# Patient Record
Sex: MALE | Race: WHITE | Employment: FULL TIME | URBAN - NONMETROPOLITAN AREA
[De-identification: names, ages, dates, MRNs, and addresses within clinical notes are randomized per-mention and may not be internally consistent; named-entity substitution may affect disease eponyms.]

---

## 2018-09-20 ENCOUNTER — APPOINTMENT (OUTPATIENT)
Dept: GENERAL RADIOLOGY | Age: 58
End: 2018-09-20
Attending: INTERNAL MEDICINE
Payer: COMMERCIAL

## 2018-09-20 ENCOUNTER — HOSPITAL ENCOUNTER (OUTPATIENT)
Dept: CARDIAC CATH/INVASIVE PROCEDURES | Age: 58
Discharge: HOME | End: 2018-09-20
Attending: INTERNAL MEDICINE | Admitting: INTERNAL MEDICINE
Payer: COMMERCIAL

## 2018-09-20 VITALS
WEIGHT: 315 LBS | HEIGHT: 69 IN | DIASTOLIC BLOOD PRESSURE: 76 MMHG | HEART RATE: 67 BPM | TEMPERATURE: 99.3 F | BODY MASS INDEX: 46.65 KG/M2 | RESPIRATION RATE: 18 BRPM | SYSTOLIC BLOOD PRESSURE: 132 MMHG | OXYGEN SATURATION: 97 %

## 2018-09-20 DIAGNOSIS — R06.02 SHORTNESS OF BREATH: ICD-10-CM

## 2018-09-20 PROBLEM — R06.00 DYSPNEA: Status: ACTIVE | Noted: 2018-09-20

## 2018-09-20 LAB
ANION GAP SERPL CALCULATED.3IONS-SCNC: 13 MMOL/L (ref 7–19)
BUN BLDV-MCNC: 20 MG/DL (ref 6–20)
CALCIUM SERPL-MCNC: 9.4 MG/DL (ref 8.6–10)
CHLORIDE BLD-SCNC: 103 MMOL/L (ref 98–111)
CO2: 24 MMOL/L (ref 22–29)
CREAT SERPL-MCNC: 0.9 MG/DL (ref 0.5–1.2)
GFR NON-AFRICAN AMERICAN: >60
GLUCOSE BLD-MCNC: 96 MG/DL (ref 74–109)
HCT VFR BLD CALC: 42.3 % (ref 42–52)
HEMOGLOBIN: 14.3 G/DL (ref 14–18)
MCH RBC QN AUTO: 29.7 PG (ref 27–31)
MCHC RBC AUTO-ENTMCNC: 33.8 G/DL (ref 33–37)
MCV RBC AUTO: 87.8 FL (ref 80–94)
PDW BLD-RTO: 14.7 % (ref 11.5–14.5)
PLATELET # BLD: 228 K/UL (ref 130–400)
PMV BLD AUTO: 9.9 FL (ref 9.4–12.4)
POTASSIUM SERPL-SCNC: 4 MMOL/L (ref 3.5–5)
RBC # BLD: 4.82 M/UL (ref 4.7–6.1)
SODIUM BLD-SCNC: 140 MMOL/L (ref 136–145)
WBC # BLD: 6.1 K/UL (ref 4.8–10.8)

## 2018-09-20 PROCEDURE — 99220 PR INITIAL OBSERVATION CARE/DAY 70 MINUTES: CPT | Performed by: INTERNAL MEDICINE

## 2018-09-20 PROCEDURE — 85027 COMPLETE CBC AUTOMATED: CPT

## 2018-09-20 PROCEDURE — 2580000003 HC RX 258: Performed by: INTERNAL MEDICINE

## 2018-09-20 PROCEDURE — C1760 CLOSURE DEV, VASC: HCPCS

## 2018-09-20 PROCEDURE — 2500000003 HC RX 250 WO HCPCS

## 2018-09-20 PROCEDURE — 2709999900 HC NON-CHARGEABLE SUPPLY

## 2018-09-20 PROCEDURE — 93005 ELECTROCARDIOGRAM TRACING: CPT

## 2018-09-20 PROCEDURE — 93458 L HRT ARTERY/VENTRICLE ANGIO: CPT | Performed by: INTERNAL MEDICINE

## 2018-09-20 PROCEDURE — 36415 COLL VENOUS BLD VENIPUNCTURE: CPT

## 2018-09-20 PROCEDURE — 71046 X-RAY EXAM CHEST 2 VIEWS: CPT

## 2018-09-20 PROCEDURE — 99024 POSTOP FOLLOW-UP VISIT: CPT | Performed by: INTERNAL MEDICINE

## 2018-09-20 PROCEDURE — C1894 INTRO/SHEATH, NON-LASER: HCPCS

## 2018-09-20 PROCEDURE — 6360000002 HC RX W HCPCS

## 2018-09-20 PROCEDURE — 80048 BASIC METABOLIC PNL TOTAL CA: CPT

## 2018-09-20 RX ORDER — SODIUM CHLORIDE 9 MG/ML
INJECTION, SOLUTION INTRAVENOUS CONTINUOUS
Status: DISCONTINUED | OUTPATIENT
Start: 2018-09-20 | End: 2018-09-20 | Stop reason: HOSPADM

## 2018-09-20 RX ORDER — FLUTICASONE PROPIONATE 50 MCG
1 SPRAY, SUSPENSION (ML) NASAL PRN
COMMUNITY
End: 2019-11-25

## 2018-09-20 RX ORDER — SODIUM CHLORIDE 0.9 % (FLUSH) 0.9 %
10 SYRINGE (ML) INJECTION PRN
Status: DISCONTINUED | OUTPATIENT
Start: 2018-09-20 | End: 2018-09-20 | Stop reason: HOSPADM

## 2018-09-20 RX ORDER — OMEPRAZOLE 40 MG/1
40 CAPSULE, DELAYED RELEASE ORAL DAILY
COMMUNITY

## 2018-09-20 RX ORDER — HYDROCHLOROTHIAZIDE 25 MG/1
25 TABLET ORAL DAILY
COMMUNITY

## 2018-09-20 RX ORDER — FENOFIBRATE 200 MG/1
200 CAPSULE ORAL
COMMUNITY

## 2018-09-20 RX ORDER — ASPIRIN 81 MG/1
81 TABLET ORAL DAILY
COMMUNITY

## 2018-09-20 RX ORDER — MONTELUKAST SODIUM 10 MG/1
10 TABLET ORAL NIGHTLY
COMMUNITY

## 2018-09-20 RX ORDER — AZELASTINE 1 MG/ML
1 SPRAY, METERED NASAL 2 TIMES DAILY
COMMUNITY
End: 2019-11-25

## 2018-09-20 RX ORDER — SODIUM CHLORIDE 0.9 % (FLUSH) 0.9 %
10 SYRINGE (ML) INJECTION EVERY 12 HOURS SCHEDULED
Status: DISCONTINUED | OUTPATIENT
Start: 2018-09-20 | End: 2018-09-20 | Stop reason: HOSPADM

## 2018-09-20 RX ORDER — NIFEDIPINE 90 MG/1
90 TABLET, EXTENDED RELEASE ORAL DAILY
COMMUNITY

## 2018-09-20 RX ORDER — ALBUTEROL SULFATE 90 UG/1
2 AEROSOL, METERED RESPIRATORY (INHALATION) EVERY 6 HOURS PRN
COMMUNITY
End: 2020-07-06

## 2018-09-20 RX ORDER — CARVEDILOL 25 MG/1
25 TABLET ORAL 2 TIMES DAILY WITH MEALS
Status: ON HOLD | COMMUNITY
End: 2019-05-03 | Stop reason: HOSPADM

## 2018-09-20 RX ORDER — M-VIT,TX,IRON,MINS/CALC/FOLIC 27MG-0.4MG
1 TABLET ORAL DAILY
COMMUNITY

## 2018-09-20 RX ORDER — ALLOPURINOL 300 MG/1
300 TABLET ORAL DAILY
COMMUNITY

## 2018-09-20 RX ORDER — LOSARTAN POTASSIUM 50 MG/1
50 TABLET ORAL 2 TIMES DAILY
COMMUNITY

## 2018-09-20 RX ADMIN — SODIUM CHLORIDE: 9 INJECTION, SOLUTION INTRAVENOUS at 13:25

## 2018-09-21 LAB
EKG P AXIS: 45 DEGREES
EKG P-R INTERVAL: 168 MS
EKG Q-T INTERVAL: 412 MS
EKG QRS DURATION: 100 MS
EKG QTC CALCULATION (BAZETT): 412 MS
EKG T AXIS: 68 DEGREES

## 2018-09-21 NOTE — H&P
yes   Hypertension yes          Cardiac Specific Problems:    Specialty Problems     None            PRIOR CARDIAC PROBLEM LIST  (IF APPLICABLE):    8/47/5458  SE  negative for myocardial ischemia [facility])  8/18/2018  SE  negative for myocardial ischemia [facility])  9/18/2018  Office visit with Dr. Dariusz Lagos, persistent chest pain despite negative stress test, AUC indication 19, AUC score 7   9/20/18  Cath mild CAD, normal LVFX      Past Medical History:    Past Medical History:   Diagnosis Date    Arthritis     Diabetes mellitus ([address]     Hyperlipidemia     Hypertension     SOB (shortness of breath) 7/17/2013         Past Surgical History:    Past Surgical History:   Procedure Laterality Date    BACK SURGERY      CHOLECYSTECTOMY      JOINT REPLACEMENT Bilateral     knees 2016         Home Medications:   Prior to Admission medications    Medication Sig Start Date End Date Taking?  Authorizing Provider   allopurinol (ZYLOPRIM) 300 MG tablet Take 300 mg by mouth daily   Yes Historical Provider, MD   losartan (COZAAR) 50 MG tablet Take 50 mg by mouth 2 times daily   Yes Historical Provider, MD   carvedilol (COREG) 25 MG tablet Take 25 mg by mouth 2 times daily (with meals)   Yes Historical Provider, MD   montelukast (SINGULAIR) 10 MG tablet Take 10 mg by mouth nightly   Yes Historical Provider, MD   fenofibrate micronized (LOFIBRA) 200 MG capsule Take 200 mg by mouth every morning (before breakfast)   Yes Historical Provider, MD   NIFEdipine (PROCARDIA XL) 90 MG extended release tablet Take 90 mg by mouth daily   Yes Historical Provider, MD   hydrochlorothiazide (HYDRODIURIL) 25 MG tablet Take 25 mg by mouth daily   Yes Historical Provider, MD   metFORMIN (GLUCOPHAGE) 500 MG tablet Take 500 mg by mouth 2 times daily (with meals)   Yes Historical Provider, MD   azelastine (ASTELIN) 0.1 % nasal spray 1 spray by Nasal route nightly Use in each nostril as directed   Yes Historical Provider, MD   albuterol sulfate  (90 Base) MCG/ACT inhaler Inhale 2 puffs into the lungs every 6 hours as needed for Wheezing   Yes Historical Provider, MD   Misc Natural Products (OSTEO BI-FLEX ADV DOUBLE ST) TABS Take 1 tablet by mouth daily   Yes Historical Provider, MD   Multiple Vitamins-Minerals (THERAPEUTIC MULTIVITAMIN-MINERALS) tablet Take 1 tablet by mouth daily   Yes Historical Provider, MD   aspirin 81 MG EC tablet Take 81 mg by mouth daily   Yes Historical Provider, MD   fluticasone (FLONASE) 50 MCG/ACT nasal spray 1 spray by Each Nare route nightly   Yes Historical Provider, MD   omeprazole (PRILOSEC) 40 MG delayed release capsule Take 40 mg by mouth daily   Yes Historical Provider, MD        Facility Administered Medications:    sodium chloride flush  10 mL Intravenous 2 times per day       Allergies:  Levaquin [levofloxacin] and Penicillins     Social History:       Social History     Social History    Marital status:      Spouse name: N/A    Number of children: N/A    Years of education: N/A     Occupational History    Not on file. Social History Main Topics    Smoking status: Never Smoker    Smokeless tobacco: Never Used    Alcohol use No    Drug use: Unknown    Sexual activity: Not on file     Other Topics Concern    Not on file     Social History Narrative    No narrative on file       Family History:   History reviewed. No pertinent family history.       REVIEW OF SYSTEMS:     Except as noted in the HPI, all other systems are negative        PHYSICAL EXAMINATION:     BP (!) 143/84   Pulse 66   Temp 99.3 °F (37.4 °C) (Temporal)   Resp 20   Ht 5' 9\" (1.753 m)   Wt (!) 330 lb (149.7 kg)   SpO2 97%   BMI 48.73 kg/m²     GENERAL - well developed and well nourished, in no amount of generalized distress; is an active participant in this examination  HEENT -  PERRLA, Hearing appears normal, conjunctiva and lids are normal, ears and nose appear normal  NECK - no thyromegaly, no JVD, trachea is in the TODAY:                                                                                                                                                                                                                                            MEDICAL DECISION MAKING             Cardiac Specific Problem / Diagnosis  Discussion and Data Reviewed Diagnostic Procedures Ordered Management Options Selected           1. Presenting problem / symptom    Persistent dyspnea / chest discomfort despite a negative stress test  are worsening   9/18/2018  Office visit with Dr. Cristino London, persistent chest pain despite negative stress test, AUC indication 19, AUC score 7 Yes: cardiac catheterization Continue current medications:     Yes:            2. Diabetes mellitus Initial presentation during this evaluation   While the primary care provider manages this disease state, I have personally reviewed these laboratory values today as they influence the cardiovascular condition that I currently manage    No Continue current medications:    Yes:            3. Systemic arterial hypertension Initial presentation during this evaluation Systolic (34RZP), GTB:762 , Min:143 , YLU:407    Diastolic (39KWX), IWX:05, Min:77, Max:84   No Continue current medications:       yes         PLAN:    1. Continue present medications except for changes as noted above  2. Continue to monitor rhythm  3. Further orders per clinical course. 4. Cardiac catheterization  5. I have discussed the risks, benefits and options with the patient and his family. They appear to understand, have no questions, and wish to proceed. This procedure is scheduled for today. Discussed with patient and family and nursing.     I greatly appreciate the opportunity and your confidence in allowing me to participate in the care of Dalton Soriano    Electronically signed by Chelly Carreon MD on 9/20/18     [facility] [place]

## 2018-09-21 NOTE — PROCEDURES
none      Results:    Hemodynamics:      Site Pressure mmHg        Left ventricular pressure 135/15 mmHg   Left ventricular end-diastolic pressure (LVEDP) 29 mmHg   Aortic pressure 138/82 mmHg   Mean aortic pressure 107 mmHg       Angiography:    Coronary Arteries:    Left Main Coronary Artery:  A large vessel which arises from the left sinus of Valsalva. It divides into the left anterior descending coronary artery and the left circumflex. There is mild diffuse disease throughout the entire length of the vessel. Left Anterior Coronary Artery:  The LAD is a moderate sized vessel with several diagonal branches. There is mild diffuse disease throughout the entire length of the vessel. Left Circumflex Coronary Artery:  The LCx is a moderate sized vessel with several marginal branches. There is mild diffuse disease throughout the entire length of the vessel. Right Coronary Artery:  The RCA is a moderate sized dominant vessel which arises from the right sinus of Valsalva. There is mild diffuse disease throughout the entire length of the vessel. Left Ventriculogram:  The left ventriculogram is obtained in the right oblique projection. All regional wall segments move appropriately. The estimated visual ejection fraction is > 60%. There is no mitral regurgitation nor is there a pull back gradient seen across the aortic valve. Summary:      1. Successful femoral artery ultrasound  2. Successful femoral artery arteriogram  3. Mild coronary artery disease  4. Left ventricular function is normal      RECOMMENDATIONS:    1.  Reassurance  2. Risk factor modification  3. Evaluation of etiologies of non cardiac chest discomfort should symptoms persist or progress  4. Follow  Up with Primary care provider as arranged  5.   Follow up with Cardiology \"prn\"       Electronically signed by Duncan Garcia MD on 9/20/18

## 2018-09-21 NOTE — DISCHARGE SUMMARY
[facility]    Discharge Summary          I personally saw the patient and rounded with:  cath lab nurses RN, on  9/20/18      The observations documented in this note, including the assessment and plan are mine              Patient ID: Fani Galan      Patient's PCP: Keon StacyKody Brooks Graves DO    Admit Date: 9/20/2018     Discharge Date:  09/20/18     Admitting Physician:  Татьяна Dodge MD       Discharge Physician: Татьяна Dodge MD     Discharge Diagnoses:    1. Dyspnea and chest discomfort of ? Etiology, doubt myocardial ischemia, cardiac catheterization, 09/20/18 mild CAD, normal left ventricular function    7/17/2013  SE  negative for myocardial ischemia [facility])  8/18/2018  SE  negative for myocardial ischemia [facility])  9/18/2018  Office visit with Dr. Nikia Acevedo, persistent chest pain despite negative stress test, AUC indication 19, AUC score 7   9/20/18  Cath mild CAD, normal LVFX    2. Diabetes mellitus  3. Hypertension  4. Hypercholestreremia        Cardiac Specific Diagnoses:    Specialty Problems     None            The patient was seen and examined on day of discharge and this discharge summary is in conjunction with any daily progress note from day of discharge. History of Present Illness:     Fani Galan is a 62 y.o. male who presents to [place] [address] with symptoms / signs / problem or diagnosis of need for cardiac catheterization. He reports to Dr. Nikia Acevedo the following:  \". .. In addition, he presented with shortness of breath. With a \"heavy sensation\" of his chest.  He denies wheezing. He says that albuterol maker the symptoms slightly better. In the past few weeks, he has also had shortness of breath that has caused him to stop unloading his truck. Symptoms have worsened in the increasing heat and humidity. ... The symptom is ongoing. The complaint moderately limits activities. ... Important triggers include activity.   \"  With persistent signs of Medication List           Details   metFORMIN (GLUCOPHAGE) 500 MG tablet HOLD METFORMIN FOR 48 HOURS AFTER THE CARDIAC CATHETERIZATION  Qty: 60 tablet, Refills: 3              Details   allopurinol (ZYLOPRIM) 300 MG tablet Take 300 mg by mouth daily      losartan (COZAAR) 50 MG tablet Take 50 mg by mouth 2 times daily      carvedilol (COREG) 25 MG tablet Take 25 mg by mouth 2 times daily (with meals)      montelukast (SINGULAIR) 10 MG tablet Take 10 mg by mouth nightly      fenofibrate micronized (LOFIBRA) 200 MG capsule Take 200 mg by mouth every morning (before breakfast)      NIFEdipine (PROCARDIA XL) 90 MG extended release tablet Take 90 mg by mouth daily      hydrochlorothiazide (HYDRODIURIL) 25 MG tablet Take 25 mg by mouth daily      azelastine (ASTELIN) 0.1 % nasal spray 1 spray by Nasal route nightly Use in each nostril as directed      albuterol sulfate  (90 Base) MCG/ACT inhaler Inhale 2 puffs into the lungs every 6 hours as needed for Wheezing      Misc Natural Products (OSTEO BI-FLEX ADV DOUBLE ST) TABS Take 1 tablet by mouth daily      Multiple Vitamins-Minerals (THERAPEUTIC MULTIVITAMIN-MINERALS) tablet Take 1 tablet by mouth daily      aspirin 81 MG EC tablet Take 81 mg by mouth daily      fluticasone (FLONASE) 50 MCG/ACT nasal spray 1 spray by Each Nare route nightly      omeprazole (PRILOSEC) 40 MG delayed release capsule Take 40 mg by mouth daily                 Disposition:      1. Reassurance  2. Risk factor modification  3. Evaluation of etiologies of non cardiac chest discomfort should symptoms persist or progress  4. Follow  Up with Primary care provider as arranged  5.   Follow up with Cardiology \"prn\"       Electronically signed by Michael Vance MD on 9/20/18

## 2019-04-30 ENCOUNTER — HOSPITAL ENCOUNTER (INPATIENT)
Age: 59
LOS: 3 days | Discharge: HOME | DRG: 287 | End: 2019-05-03
Attending: INTERNAL MEDICINE | Admitting: INTERNAL MEDICINE
Payer: COMMERCIAL

## 2019-04-30 ENCOUNTER — APPOINTMENT (OUTPATIENT)
Dept: GENERAL RADIOLOGY | Age: 59
DRG: 287 | End: 2019-04-30
Attending: INTERNAL MEDICINE
Payer: COMMERCIAL

## 2019-04-30 PROBLEM — I48.91 A-FIB (HCC): Status: ACTIVE | Noted: 2019-04-30

## 2019-04-30 LAB
ALBUMIN SERPL-MCNC: 4.3 G/DL (ref 3.5–5.2)
ALP BLD-CCNC: 69 U/L (ref 40–130)
ALT SERPL-CCNC: 40 U/L (ref 5–41)
ANION GAP SERPL CALCULATED.3IONS-SCNC: 17 MMOL/L (ref 7–19)
AST SERPL-CCNC: 27 U/L (ref 5–40)
BASOPHILS ABSOLUTE: 0.1 K/UL (ref 0–0.2)
BASOPHILS RELATIVE PERCENT: 1.1 % (ref 0–1)
BILIRUB SERPL-MCNC: 0.7 MG/DL (ref 0.2–1.2)
BILIRUBIN URINE: NEGATIVE
BLOOD, URINE: NEGATIVE
BUN BLDV-MCNC: 14 MG/DL (ref 6–20)
CALCIUM SERPL-MCNC: 9.6 MG/DL (ref 8.6–10)
CHLORIDE BLD-SCNC: 105 MMOL/L (ref 98–111)
CLARITY: CLEAR
CO2: 21 MMOL/L (ref 22–29)
COLOR: YELLOW
CREAT SERPL-MCNC: 0.8 MG/DL (ref 0.5–1.2)
EOSINOPHILS ABSOLUTE: 0.3 K/UL (ref 0–0.6)
EOSINOPHILS RELATIVE PERCENT: 3.6 % (ref 0–5)
GFR NON-AFRICAN AMERICAN: >60
GLUCOSE BLD-MCNC: 101 MG/DL (ref 70–99)
GLUCOSE BLD-MCNC: 102 MG/DL (ref 74–109)
GLUCOSE URINE: NEGATIVE MG/DL
HCT VFR BLD CALC: 46.6 % (ref 42–52)
HEMOGLOBIN: 16 G/DL (ref 14–18)
KETONES, URINE: NEGATIVE MG/DL
LEUKOCYTE ESTERASE, URINE: NEGATIVE
LYMPHOCYTES ABSOLUTE: 2.1 K/UL (ref 1.1–4.5)
LYMPHOCYTES RELATIVE PERCENT: 27.3 % (ref 20–40)
MCH RBC QN AUTO: 29.8 PG (ref 27–31)
MCHC RBC AUTO-ENTMCNC: 34.3 G/DL (ref 33–37)
MCV RBC AUTO: 86.8 FL (ref 80–94)
MONOCYTES ABSOLUTE: 0.9 K/UL (ref 0–0.9)
MONOCYTES RELATIVE PERCENT: 11.3 % (ref 0–10)
NEUTROPHILS ABSOLUTE: 4.3 K/UL (ref 1.5–7.5)
NEUTROPHILS RELATIVE PERCENT: 56.3 % (ref 50–65)
NITRITE, URINE: NEGATIVE
PDW BLD-RTO: 14.8 % (ref 11.5–14.5)
PERFORMED ON: ABNORMAL
PH UA: 7 (ref 5–8)
PLATELET # BLD: 303 K/UL (ref 130–400)
PMV BLD AUTO: 10.6 FL (ref 9.4–12.4)
POTASSIUM REFLEX MAGNESIUM: 3.9 MMOL/L (ref 3.5–5)
PROTEIN UA: NEGATIVE MG/DL
RBC # BLD: 5.37 M/UL (ref 4.7–6.1)
SODIUM BLD-SCNC: 143 MMOL/L (ref 136–145)
SPECIFIC GRAVITY UA: 1.01 (ref 1–1.03)
TOTAL PROTEIN: 6.7 G/DL (ref 6.6–8.7)
TROPONIN: <0.01 NG/ML (ref 0–0.03)
UROBILINOGEN, URINE: 1 E.U./DL
WBC # BLD: 7.6 K/UL (ref 4.8–10.8)

## 2019-04-30 PROCEDURE — 71045 X-RAY EXAM CHEST 1 VIEW: CPT

## 2019-04-30 PROCEDURE — 82948 REAGENT STRIP/BLOOD GLUCOSE: CPT

## 2019-04-30 PROCEDURE — 2500000003 HC RX 250 WO HCPCS: Performed by: INTERNAL MEDICINE

## 2019-04-30 PROCEDURE — 99223 1ST HOSP IP/OBS HIGH 75: CPT | Performed by: INTERNAL MEDICINE

## 2019-04-30 PROCEDURE — 6360000002 HC RX W HCPCS: Performed by: INTERNAL MEDICINE

## 2019-04-30 PROCEDURE — 6370000000 HC RX 637 (ALT 250 FOR IP): Performed by: INTERNAL MEDICINE

## 2019-04-30 PROCEDURE — 85025 COMPLETE CBC W/AUTO DIFF WBC: CPT

## 2019-04-30 PROCEDURE — 93005 ELECTROCARDIOGRAM TRACING: CPT

## 2019-04-30 PROCEDURE — 81003 URINALYSIS AUTO W/O SCOPE: CPT

## 2019-04-30 PROCEDURE — 2580000003 HC RX 258: Performed by: INTERNAL MEDICINE

## 2019-04-30 PROCEDURE — 87081 CULTURE SCREEN ONLY: CPT

## 2019-04-30 PROCEDURE — 84484 ASSAY OF TROPONIN QUANT: CPT

## 2019-04-30 PROCEDURE — 2100000000 HC CCU R&B

## 2019-04-30 PROCEDURE — 36415 COLL VENOUS BLD VENIPUNCTURE: CPT

## 2019-04-30 PROCEDURE — 80053 COMPREHEN METABOLIC PANEL: CPT

## 2019-04-30 RX ORDER — ASPIRIN 81 MG/1
81 TABLET ORAL DAILY
Status: DISCONTINUED | OUTPATIENT
Start: 2019-04-30 | End: 2019-05-03 | Stop reason: HOSPADM

## 2019-04-30 RX ORDER — NIFEDIPINE 90 MG/1
90 TABLET, EXTENDED RELEASE ORAL DAILY
Status: DISCONTINUED | OUTPATIENT
Start: 2019-05-01 | End: 2019-05-03 | Stop reason: HOSPADM

## 2019-04-30 RX ORDER — HYDROCHLOROTHIAZIDE 25 MG/1
25 TABLET ORAL DAILY
Status: DISCONTINUED | OUTPATIENT
Start: 2019-05-01 | End: 2019-05-03 | Stop reason: HOSPADM

## 2019-04-30 RX ORDER — FLUTICASONE PROPIONATE 50 MCG
1 SPRAY, SUSPENSION (ML) NASAL PRN
Status: DISCONTINUED | OUTPATIENT
Start: 2019-04-30 | End: 2019-05-03 | Stop reason: HOSPADM

## 2019-04-30 RX ORDER — PANTOPRAZOLE SODIUM 40 MG/1
40 TABLET, DELAYED RELEASE ORAL
Status: DISCONTINUED | OUTPATIENT
Start: 2019-04-30 | End: 2019-05-03 | Stop reason: HOSPADM

## 2019-04-30 RX ORDER — ALBUTEROL SULFATE 90 UG/1
2 AEROSOL, METERED RESPIRATORY (INHALATION) EVERY 6 HOURS PRN
Status: DISCONTINUED | OUTPATIENT
Start: 2019-04-30 | End: 2019-05-03 | Stop reason: HOSPADM

## 2019-04-30 RX ORDER — FENOFIBRATE 160 MG/1
160 TABLET ORAL
Status: DISCONTINUED | OUTPATIENT
Start: 2019-05-01 | End: 2019-05-03 | Stop reason: HOSPADM

## 2019-04-30 RX ORDER — DILTIAZEM HYDROCHLORIDE 5 MG/ML
10 INJECTION INTRAVENOUS ONCE
Status: COMPLETED | OUTPATIENT
Start: 2019-04-30 | End: 2019-04-30

## 2019-04-30 RX ORDER — DOCUSATE SODIUM 100 MG/1
100 CAPSULE, LIQUID FILLED ORAL DAILY PRN
Status: DISCONTINUED | OUTPATIENT
Start: 2019-04-30 | End: 2019-05-03 | Stop reason: HOSPADM

## 2019-04-30 RX ORDER — AMIODARONE HYDROCHLORIDE 200 MG/1
300 TABLET ORAL DAILY
Status: DISCONTINUED | OUTPATIENT
Start: 2019-04-30 | End: 2019-05-01

## 2019-04-30 RX ORDER — AMIODARONE HYDROCHLORIDE 50 MG/ML
150 INJECTION, SOLUTION INTRAVENOUS ONCE
Status: DISCONTINUED | OUTPATIENT
Start: 2019-04-30 | End: 2019-04-30 | Stop reason: SDUPTHER

## 2019-04-30 RX ORDER — ACETAMINOPHEN 325 MG/1
650 TABLET ORAL EVERY 4 HOURS PRN
Status: DISCONTINUED | OUTPATIENT
Start: 2019-04-30 | End: 2019-05-03 | Stop reason: HOSPADM

## 2019-04-30 RX ORDER — MONTELUKAST SODIUM 10 MG/1
10 TABLET ORAL NIGHTLY
Status: DISCONTINUED | OUTPATIENT
Start: 2019-04-30 | End: 2019-05-03 | Stop reason: HOSPADM

## 2019-04-30 RX ORDER — M-VIT,TX,IRON,MINS/CALC/FOLIC 27MG-0.4MG
1 TABLET ORAL DAILY
Status: DISCONTINUED | OUTPATIENT
Start: 2019-04-30 | End: 2019-05-03 | Stop reason: HOSPADM

## 2019-04-30 RX ORDER — CARVEDILOL 25 MG/1
25 TABLET ORAL 2 TIMES DAILY WITH MEALS
Status: DISCONTINUED | OUTPATIENT
Start: 2019-04-30 | End: 2019-05-03

## 2019-04-30 RX ORDER — LOSARTAN POTASSIUM 50 MG/1
50 TABLET ORAL 2 TIMES DAILY
Status: DISCONTINUED | OUTPATIENT
Start: 2019-04-30 | End: 2019-05-03 | Stop reason: HOSPADM

## 2019-04-30 RX ORDER — ONDANSETRON 2 MG/ML
4 INJECTION INTRAMUSCULAR; INTRAVENOUS EVERY 6 HOURS PRN
Status: DISCONTINUED | OUTPATIENT
Start: 2019-04-30 | End: 2019-05-03 | Stop reason: HOSPADM

## 2019-04-30 RX ORDER — ALLOPURINOL 300 MG/1
300 TABLET ORAL DAILY
Status: DISCONTINUED | OUTPATIENT
Start: 2019-05-01 | End: 2019-05-03 | Stop reason: HOSPADM

## 2019-04-30 RX ORDER — DOCUSATE SODIUM 100 MG/1
100 CAPSULE, LIQUID FILLED ORAL DAILY
Status: DISCONTINUED | OUTPATIENT
Start: 2019-04-30 | End: 2019-04-30

## 2019-04-30 RX ADMIN — LOSARTAN POTASSIUM 50 MG: 50 TABLET ORAL at 20:35

## 2019-04-30 RX ADMIN — DEXTROSE MONOHYDRATE 150 MG: 50 INJECTION, SOLUTION INTRAVENOUS at 21:54

## 2019-04-30 RX ADMIN — DEXTROSE MONOHYDRATE 150 MG: 50 INJECTION, SOLUTION INTRAVENOUS at 16:57

## 2019-04-30 RX ADMIN — CARVEDILOL 25 MG: 25 TABLET, FILM COATED ORAL at 16:50

## 2019-04-30 RX ADMIN — DILTIAZEM HYDROCHLORIDE 10 MG: 5 INJECTION INTRAVENOUS at 21:41

## 2019-04-30 RX ADMIN — MONTELUKAST SODIUM 10 MG: 10 TABLET, FILM COATED ORAL at 20:35

## 2019-04-30 RX ADMIN — APIXABAN 5 MG: 5 TABLET, FILM COATED ORAL at 20:35

## 2019-04-30 RX ADMIN — AMIODARONE HYDROCHLORIDE 300 MG: 200 TABLET ORAL at 16:50

## 2019-04-30 RX ADMIN — METFORMIN HYDROCHLORIDE 500 MG: 500 TABLET ORAL at 16:51

## 2019-04-30 RX ADMIN — AMIODARONE HYDROCHLORIDE 1 MG/MIN: 50 INJECTION, SOLUTION INTRAVENOUS at 17:12

## 2019-04-30 RX ADMIN — DILTIAZEM HYDROCHLORIDE 5 MG/HR: 5 INJECTION INTRAVENOUS at 22:08

## 2019-04-30 RX ADMIN — ASPIRIN 81 MG: 81 TABLET ORAL at 16:50

## 2019-04-30 ASSESSMENT — PAIN SCALES - GENERAL
PAINLEVEL_OUTOF10: 0

## 2019-04-30 NOTE — PROGRESS NOTES
Dr Elyse Rinne in unit rounding on pt.  Verbal face to face medication clarification made regarding pt home medications and amiodarone orders  Electronically signed by Maritza Santiago RN on 4/30/2019 at 5:39 PM

## 2019-05-01 ENCOUNTER — APPOINTMENT (OUTPATIENT)
Dept: NUCLEAR MEDICINE | Age: 59
DRG: 287 | End: 2019-05-01
Attending: INTERNAL MEDICINE
Payer: COMMERCIAL

## 2019-05-01 LAB
ANION GAP SERPL CALCULATED.3IONS-SCNC: 10 MMOL/L (ref 7–19)
BUN BLDV-MCNC: 18 MG/DL (ref 6–20)
CALCIUM SERPL-MCNC: 9.5 MG/DL (ref 8.6–10)
CHLORIDE BLD-SCNC: 104 MMOL/L (ref 98–111)
CHOLESTEROL, TOTAL: 183 MG/DL (ref 160–199)
CO2: 25 MMOL/L (ref 22–29)
CREAT SERPL-MCNC: 1.1 MG/DL (ref 0.5–1.2)
EKG P AXIS: 29 DEGREES
EKG P AXIS: NORMAL DEGREES
EKG P-R INTERVAL: 144 MS
EKG P-R INTERVAL: NORMAL MS
EKG Q-T INTERVAL: 306 MS
EKG Q-T INTERVAL: 338 MS
EKG QRS DURATION: 90 MS
EKG QRS DURATION: 92 MS
EKG QTC CALCULATION (BAZETT): 427 MS
EKG QTC CALCULATION (BAZETT): 444 MS
EKG T AXIS: 24 DEGREES
EKG T AXIS: 93 DEGREES
GFR NON-AFRICAN AMERICAN: >60
GLUCOSE BLD-MCNC: 117 MG/DL (ref 70–99)
GLUCOSE BLD-MCNC: 138 MG/DL (ref 74–109)
GLUCOSE BLD-MCNC: 95 MG/DL (ref 70–99)
HBA1C MFR BLD: 5.3 % (ref 4–6)
HCT VFR BLD CALC: 45.4 % (ref 42–52)
HDLC SERPL-MCNC: 25 MG/DL (ref 55–121)
HEMOGLOBIN: 15.7 G/DL (ref 14–18)
LDL CHOLESTEROL CALCULATED: 99 MG/DL
LV EF: 59 %
LVEF MODALITY: NORMAL
MCH RBC QN AUTO: 30.5 PG (ref 27–31)
MCHC RBC AUTO-ENTMCNC: 34.6 G/DL (ref 33–37)
MCV RBC AUTO: 88.2 FL (ref 80–94)
MRSA CULTURE ONLY: NORMAL
PDW BLD-RTO: 14.7 % (ref 11.5–14.5)
PERFORMED ON: ABNORMAL
PERFORMED ON: NORMAL
PLATELET # BLD: 307 K/UL (ref 130–400)
PMV BLD AUTO: 10.3 FL (ref 9.4–12.4)
POTASSIUM SERPL-SCNC: 4.2 MMOL/L (ref 3.5–5)
RBC # BLD: 5.15 M/UL (ref 4.7–6.1)
SODIUM BLD-SCNC: 139 MMOL/L (ref 136–145)
TRIGL SERPL-MCNC: 297 MG/DL (ref 0–149)
WBC # BLD: 9.2 K/UL (ref 4.8–10.8)

## 2019-05-01 PROCEDURE — 83036 HEMOGLOBIN GLYCOSYLATED A1C: CPT

## 2019-05-01 PROCEDURE — 2580000003 HC RX 258: Performed by: INTERNAL MEDICINE

## 2019-05-01 PROCEDURE — 36415 COLL VENOUS BLD VENIPUNCTURE: CPT

## 2019-05-01 PROCEDURE — 93005 ELECTROCARDIOGRAM TRACING: CPT

## 2019-05-01 PROCEDURE — 2100000000 HC CCU R&B

## 2019-05-01 PROCEDURE — 82948 REAGENT STRIP/BLOOD GLUCOSE: CPT

## 2019-05-01 PROCEDURE — 85027 COMPLETE CBC AUTOMATED: CPT

## 2019-05-01 PROCEDURE — 6370000000 HC RX 637 (ALT 250 FOR IP): Performed by: INTERNAL MEDICINE

## 2019-05-01 PROCEDURE — 6360000002 HC RX W HCPCS: Performed by: INTERNAL MEDICINE

## 2019-05-01 PROCEDURE — 99233 SBSQ HOSP IP/OBS HIGH 50: CPT | Performed by: INTERNAL MEDICINE

## 2019-05-01 PROCEDURE — 80048 BASIC METABOLIC PNL TOTAL CA: CPT

## 2019-05-01 PROCEDURE — 2500000003 HC RX 250 WO HCPCS: Performed by: INTERNAL MEDICINE

## 2019-05-01 PROCEDURE — 80061 LIPID PANEL: CPT

## 2019-05-01 PROCEDURE — A9500 TC99M SESTAMIBI: HCPCS | Performed by: INTERNAL MEDICINE

## 2019-05-01 PROCEDURE — 3430000000 HC RX DIAGNOSTIC RADIOPHARMACEUTICAL: Performed by: INTERNAL MEDICINE

## 2019-05-01 PROCEDURE — 93306 TTE W/DOPPLER COMPLETE: CPT

## 2019-05-01 PROCEDURE — 78452 HT MUSCLE IMAGE SPECT MULT: CPT

## 2019-05-01 RX ORDER — SODIUM CHLORIDE 0.9 % (FLUSH) 0.9 %
10 SYRINGE (ML) INJECTION PRN
Status: DISCONTINUED | OUTPATIENT
Start: 2019-05-01 | End: 2019-05-03

## 2019-05-01 RX ORDER — SODIUM CHLORIDE 9 MG/ML
INJECTION, SOLUTION INTRAVENOUS CONTINUOUS
Status: DISCONTINUED | OUTPATIENT
Start: 2019-05-01 | End: 2019-05-03 | Stop reason: HOSPADM

## 2019-05-01 RX ORDER — SOTALOL HYDROCHLORIDE 80 MG/1
80 TABLET ORAL 2 TIMES DAILY
Status: DISCONTINUED | OUTPATIENT
Start: 2019-05-01 | End: 2019-05-03 | Stop reason: HOSPADM

## 2019-05-01 RX ORDER — SODIUM CHLORIDE 0.9 % (FLUSH) 0.9 %
10 SYRINGE (ML) INJECTION EVERY 12 HOURS SCHEDULED
Status: DISCONTINUED | OUTPATIENT
Start: 2019-05-01 | End: 2019-05-03

## 2019-05-01 RX ADMIN — APIXABAN 5 MG: 5 TABLET, FILM COATED ORAL at 08:10

## 2019-05-01 RX ADMIN — MULTIPLE VITAMINS W/ MINERALS TAB 1 TABLET: TAB at 08:10

## 2019-05-01 RX ADMIN — AMIODARONE HYDROCHLORIDE 0.5 MG/MIN: 50 INJECTION, SOLUTION INTRAVENOUS at 13:36

## 2019-05-01 RX ADMIN — SODIUM CHLORIDE: 9 INJECTION, SOLUTION INTRAVENOUS at 06:39

## 2019-05-01 RX ADMIN — AMIODARONE HYDROCHLORIDE 0.5 MG/MIN: 50 INJECTION, SOLUTION INTRAVENOUS at 03:52

## 2019-05-01 RX ADMIN — REGADENOSON 0.4 MG: 0.08 INJECTION, SOLUTION INTRAVENOUS at 13:00

## 2019-05-01 RX ADMIN — AMIODARONE HYDROCHLORIDE 300 MG: 200 TABLET ORAL at 08:10

## 2019-05-01 RX ADMIN — SOTALOL HYDROCHLORIDE 80 MG: 80 TABLET ORAL at 20:37

## 2019-05-01 RX ADMIN — NIFEDIPINE 90 MG: 90 TABLET, FILM COATED, EXTENDED RELEASE ORAL at 08:10

## 2019-05-01 RX ADMIN — Medication 10 ML: at 20:37

## 2019-05-01 RX ADMIN — CARVEDILOL 25 MG: 25 TABLET, FILM COATED ORAL at 18:07

## 2019-05-01 RX ADMIN — CARVEDILOL 25 MG: 25 TABLET, FILM COATED ORAL at 08:10

## 2019-05-01 RX ADMIN — PANTOPRAZOLE SODIUM 40 MG: 40 TABLET, DELAYED RELEASE ORAL at 18:06

## 2019-05-01 RX ADMIN — TETRAKIS(2-METHOXYISOBUTYLISOCYANIDE)COPPER(I) TETRAFLUOROBORATE 30 MILLICURIE: 1 INJECTION, POWDER, LYOPHILIZED, FOR SOLUTION INTRAVENOUS at 13:29

## 2019-05-01 RX ADMIN — LOSARTAN POTASSIUM 50 MG: 50 TABLET ORAL at 20:37

## 2019-05-01 RX ADMIN — HYDROCHLOROTHIAZIDE 25 MG: 25 TABLET ORAL at 08:10

## 2019-05-01 RX ADMIN — APIXABAN 5 MG: 5 TABLET, FILM COATED ORAL at 20:37

## 2019-05-01 RX ADMIN — FENOFIBRATE 160 MG: 160 TABLET ORAL at 06:39

## 2019-05-01 RX ADMIN — ASPIRIN 81 MG: 81 TABLET ORAL at 08:10

## 2019-05-01 RX ADMIN — LOSARTAN POTASSIUM 50 MG: 50 TABLET ORAL at 08:10

## 2019-05-01 RX ADMIN — TETRAKIS(2-METHOXYISOBUTYLISOCYANIDE)COPPER(I) TETRAFLUOROBORATE 10 MILLICURIE: 1 INJECTION, POWDER, LYOPHILIZED, FOR SOLUTION INTRAVENOUS at 13:28

## 2019-05-01 RX ADMIN — MONTELUKAST SODIUM 10 MG: 10 TABLET, FILM COATED ORAL at 20:37

## 2019-05-01 RX ADMIN — PANTOPRAZOLE SODIUM 40 MG: 40 TABLET, DELAYED RELEASE ORAL at 06:39

## 2019-05-01 RX ADMIN — ALLOPURINOL 300 MG: 300 TABLET ORAL at 08:10

## 2019-05-01 RX ADMIN — DILTIAZEM HYDROCHLORIDE 2.5 MG/HR: 5 INJECTION INTRAVENOUS at 14:13

## 2019-05-01 RX ADMIN — Medication 10 ML: at 08:11

## 2019-05-01 ASSESSMENT — PAIN SCALES - GENERAL
PAINLEVEL_OUTOF10: 0

## 2019-05-01 NOTE — PROGRESS NOTES
Pt rate still holding 137-143. Pt states he no longer feels like his heart is beating as hard, stated earlier it was shaking his chest when it beat. Pt also brought CPAP from home to sleep with. Will continue to monitor.   Electronically signed by Adalberto Cueto RN on 4/30/2019 at 11:55 PM.

## 2019-05-01 NOTE — H&P
(FLONASE) 50 MCG/ACT nasal spray 1 spray by Each Nare route as needed    Yes Historical Provider, MD   omeprazole (PRILOSEC) 40 MG delayed release capsule Take 40 mg by mouth daily   Yes Historical Provider, MD   metFORMIN (GLUCOPHAGE) 500 MG tablet HOLD METFORMIN FOR 48 HOURS AFTER THE CARDIAC CATHETERIZATION 9/20/18  Yes Nahomy Ward MD   albuterol sulfate  (90 Base) MCG/ACT inhaler Inhale 2 puffs into the lungs every 6 hours as needed for Wheezing    Historical Provider, MD        Facility Administered Medications:    apixaban  5 mg Oral BID    amiodarone  300 mg Oral Daily    [START ON 5/1/2019] allopurinol  300 mg Oral Daily    aspirin  81 mg Oral Daily    carvedilol  25 mg Oral BID WC    [START ON 5/1/2019] fenofibrate  160 mg Oral QAM AC    [START ON 5/1/2019] hydrochlorothiazide  25 mg Oral Daily    losartan  50 mg Oral BID    metFORMIN  500 mg Oral BID WC    montelukast  10 mg Oral Nightly    therapeutic multivitamin-minerals  1 tablet Oral Daily    [START ON 5/1/2019] NIFEdipine  90 mg Oral Daily    pantoprazole  40 mg Oral BID AC       Allergies:  Levaquin [levofloxacin] and Penicillins     Social History:       Social History     Socioeconomic History    Marital status:      Spouse name: Kylie Agustin Number of children: 2    Years of education: Not on file    Highest education level: Not on file   Occupational History    Not on file   Social Needs    Financial resource strain: Not on file    Food insecurity:     Worry: Not on file     Inability: Not on file    Transportation needs:     Medical: Not on file     Non-medical: Not on file   Tobacco Use    Smoking status: Never Smoker    Smokeless tobacco: Never Used   Substance and Sexual Activity    Alcohol use: No    Drug use: Never    Sexual activity: Yes     Partners: Female   Lifestyle    Physical activity:     Days per week: Not on file     Minutes per session: Not on file    Stress: Not on file   Relationships  Social connections:     Talks on phone: Not on file     Gets together: Not on file     Attends [employer] service: Not on file     Active member of club or organization: Not on file     Attends meetings of clubs or organizations: Not on file     Relationship status: Not on file    Intimate partner violence:     Fear of current or ex partner: Not on file     Emotionally abused: Not on file     Physically abused: Not on file     Forced sexual activity: Not on file   Other Topics Concern    Not on file   Social History Narrative    Not on file       Family History:     Family History   Problem Relation Age of Onset    Cancer Mother     Coronary Art Dis Father     Heart Attack Father     Heart Disease Father     High Blood Pressure Father     High Cholesterol Father     No Known Problems Sister     No Known Problems Brother     No Known Problems Brother          REVIEW OF SYSTEMS:     Except as noted in the HPI, all other systems are negative        PHYSICAL EXAMINATION:     /78   Pulse 145   Temp 97.8 °F (36.6 °C) (Temporal)   Resp 23   Ht 5' 9\" (1.753 m)   Wt (!) 341 lb 4.8 oz (154.8 kg)   SpO2 92%   BMI 50.40 kg/m²     GENERAL - well developed and well nourished, in no amount of generalized distress; is not an active participant in this examination  HEENT -  PERRLA, Hearing appears normal, conjunctiva and lids are normal, ears and nose appear normal  NECK - no thyromegaly, no JVD, trachea is in the midline  CARDIOVASCULAR - PMI is in the left mid line clavicular position, Normal S1 and S2 with a grade 1/6 systolic murmur. No S3 or S4    PULMONARY - No respiratory distress. scattered wheezes and rales.   Breath sounds in both  lung fields are Decreased  ABDOMEN  - soft, non tender, no rebound, no hepatomegaly or splenomegaly  MUSCULOSKELETAL  - Prone/Supine, digitals and nails are without clubbing or cyanosis  EXTREMITIES - trace edema  NEUROLOGIC - cranial nerves, II-XII, are normal  SKIN - turgor is normal, no rash  PSYCHIATRIC - normal mood and affect, alert and orientated x 3, judgement and insight appear appropriate      LABORATORY EVALUATION & TESTING:    I have personally reviewed and interpreted the results of the following diagnostic testing      EKG and or Telemetry:  which was personally reviewed me:  Atrial fibrillation rhythm,   Pulse Readings from Last 1 Encounters:   04/30/19 145    bpm,  without Acute changes    Troponin:  negative myocardial necrosis (  <0.01); the creatinine is normal    CBC:   Recent Labs     04/30/19  1335   WBC 7.6   HGB 16.0   HCT 46.6   MCV 86.8        BMP:   Recent Labs     04/30/19  1335      K 3.9      CO2 21*   BUN 14   CREATININE 0.8     Cardiac Enzymes:   Recent Labs     04/30/19  1335   TROPONINI <0.01     PT/INR: No results for input(s): PROTIME, INR in the last 72 hours. APTT: No results for input(s): APTT in the last 72 hours.   Liver Profile:  Lab Results   Component Value Date    AST 27 04/30/2019    ALT 40 04/30/2019    BILITOT 0.7 04/30/2019    ALKPHOS 69 04/30/2019   No results found for: CHOL, HDL, TRIG  TSH:  No results found for: TSH  UA:   Lab Results   Component Value Date    COLORU YELLOW 04/30/2019    PHUR 7.0 04/30/2019    CLARITYU Clear 04/30/2019    SPECGRAV 1.015 04/30/2019    LEUKOCYTESUR Negative 04/30/2019    UROBILINOGEN 1.0 04/30/2019    BILIRUBINUR Negative 04/30/2019    BLOODU Negative 04/30/2019    GLUCOSEU Negative 04/30/2019             ALL THE CARDIOLOGY PROBLEMS ARE LISTED ABOVE; HOWEVER, THE FOLLOWING SPECIFIC CARDIAC PROBLEMS WERE ADDRESSED AND TREATED DURING THE HOSPITAL VISIT TODAY:                                                                                                                                                                                                                                            MEDICAL DECISION MAKING             Cardiac Specific Problem / Diagnosis  Discussion and Data Reviewed Diagnostic Procedures Ordered Management Options Selected           1. Presenting problem / symptom    paroxymal atrial fibrillation  show no change   The Atrial Fibrillation Stroke Risk is calculated according to the     [id number] Score      Risk   Factors  Component Value   C CHF No 0   H HTN Yes 1   A2 Age >= 75 No,  (59 y.o.) 0   D DM No 0   S2 Prior Stroke/TIA No 0   V Vascular Disease No 0   A Age 74-69 No,  (59 y.o.) 0   Sc Sex male 0    [id number]  Score  1   Score last updated 4/30/19 9:10 PM        [id number] Score Annual Stroke Risk %   0 0   1 1.3   2 2.2   3 3.2   4 4.0   5 6.7   6 9.8   7 9.6   8 12.5   9 15.2         Yes: DCCV Continue current medications:     Yes:            2. Prior cardiovascular evaluation Initial presentation during this evaluation   Review and summation of old records:    7/17/2013  SE  negative for myocardial ischemia [facility])  8/18/2018  SE  negative for myocardial ischemia [facility])  9/18/2018  Office visit with Dr. Stephane Herrera, persistent chest pain despite negative stress test, AUC indication 19, AUC score 7   9/20/18  Cath mild CAD, normal LVFX       No Continue current medications:    Yes: amiodarone and DCCV           3. Systemic arterial hypertension Initial presentation during this evaluation Systolic (58BIS), JKB:957 , Min:116 , TER:457    Diastolic (91TJM), JTJ:08, Min:77, Max:97   No Continue current medications:       yes         DISCUSSION AND PLAN:    1. I had a detailed discussion with the patient and / or family regarding the historical points, physical examination findings, and any diagnostic results supporting the admission diagnosis. The patient was educated on care and need for admission. questions were invited and answered. Patient and / or family shows understanding of admission information and agrees to follow. 2. Continue present medications except for changes as noted above    3. Continue to monitor rhythm    4.  Further orders per clinical course. Date of the Proposed Procedure:   5/1/2019     Proposed Procedure:  Direct Current Cardioversion (DDCV)    :  GUSTAVO Keith MD    Indications:  Paroxysmal Atrial Fibrillation    American Society of Anesthesiologists (ASA) Classification:  III    Plan of Sedation:  Moderate Sedation    Mallampati Classification:  II    I have examined this patient and find no interval changes since the original History and Physical / Consult note written by myself, on 04/30/19     With the constellation of symptoms and these findings, I recommend direct current cardioversion (DCCV). I discussed with him  in detail  the risks, benefits and alternatives of this procedure. The risks mentioned to him are rare but can potentially include:  More dangerous heart rhythms (which are treated with medications or a stronger electrical shock), other less dangerous abnormal rhythms, temporary low blood pressure, heart damage (usually temporary and without symptoms), heart failure, skin irritation, and dislodged blood clots, which can cause stroke, pulmonary embolism, or other problems. Additionally, there are risks associated with moderate sedation which includes transient drop in blood pressure and need for assisted ventilation. This procedure is scheduled for 05/01/19 .     Nahomy Ward MD

## 2019-05-01 NOTE — PROGRESS NOTES
Dr. Jerrod Grijalva rounded on unit and saw pt. See new orders.    Electronically signed by Reed Camilo RN on 4/30/2019 at 11:50 PM

## 2019-05-02 LAB
LV EF: 60 %
LVEF MODALITY: NORMAL

## 2019-05-02 PROCEDURE — 99233 SBSQ HOSP IP/OBS HIGH 50: CPT | Performed by: INTERNAL MEDICINE

## 2019-05-02 PROCEDURE — 2100000000 HC CCU R&B

## 2019-05-02 PROCEDURE — 6370000000 HC RX 637 (ALT 250 FOR IP): Performed by: INTERNAL MEDICINE

## 2019-05-02 PROCEDURE — 93005 ELECTROCARDIOGRAM TRACING: CPT

## 2019-05-02 PROCEDURE — 2580000003 HC RX 258: Performed by: INTERNAL MEDICINE

## 2019-05-02 RX ADMIN — CARVEDILOL 25 MG: 25 TABLET, FILM COATED ORAL at 16:35

## 2019-05-02 RX ADMIN — PANTOPRAZOLE SODIUM 40 MG: 40 TABLET, DELAYED RELEASE ORAL at 16:36

## 2019-05-02 RX ADMIN — Medication 10 ML: at 08:56

## 2019-05-02 RX ADMIN — ALLOPURINOL 300 MG: 300 TABLET ORAL at 08:34

## 2019-05-02 RX ADMIN — LOSARTAN POTASSIUM 50 MG: 50 TABLET ORAL at 20:21

## 2019-05-02 RX ADMIN — ASPIRIN 81 MG: 81 TABLET ORAL at 08:34

## 2019-05-02 RX ADMIN — HYDROCHLOROTHIAZIDE 25 MG: 25 TABLET ORAL at 08:35

## 2019-05-02 RX ADMIN — SOTALOL HYDROCHLORIDE 80 MG: 80 TABLET ORAL at 09:01

## 2019-05-02 RX ADMIN — SOTALOL HYDROCHLORIDE 80 MG: 80 TABLET ORAL at 20:21

## 2019-05-02 RX ADMIN — APIXABAN 5 MG: 5 TABLET, FILM COATED ORAL at 20:21

## 2019-05-02 RX ADMIN — CARVEDILOL 25 MG: 25 TABLET, FILM COATED ORAL at 08:36

## 2019-05-02 RX ADMIN — MONTELUKAST SODIUM 10 MG: 10 TABLET, FILM COATED ORAL at 20:21

## 2019-05-02 RX ADMIN — MULTIPLE VITAMINS W/ MINERALS TAB 1 TABLET: TAB at 08:34

## 2019-05-02 RX ADMIN — LOSARTAN POTASSIUM 50 MG: 50 TABLET ORAL at 08:36

## 2019-05-02 RX ADMIN — FENOFIBRATE 160 MG: 160 TABLET ORAL at 06:23

## 2019-05-02 RX ADMIN — PANTOPRAZOLE SODIUM 40 MG: 40 TABLET, DELAYED RELEASE ORAL at 06:23

## 2019-05-02 RX ADMIN — NIFEDIPINE 90 MG: 90 TABLET, FILM COATED, EXTENDED RELEASE ORAL at 08:34

## 2019-05-02 RX ADMIN — Medication 10 ML: at 20:21

## 2019-05-02 ASSESSMENT — PAIN SCALES - GENERAL
PAINLEVEL_OUTOF10: 0

## 2019-05-02 NOTE — PROGRESS NOTES
[facility] CHECK LIST    Permit signed and in chart:  yes    Bath prior to procedure? yes    Prep groins bilaterally by shaving at least 3 inches around the femoral pulse area. (use clippers not razor) yes    Edgar dorsalis pedis and the posterior tibial pulses with marker. yes    Has patient been NPO since midnight or as ordered? yes        Accurate height and weight on chart? yes     Allergy list accurate? yes     CBC, CMP, troponin completed for baseline? yes     Does patient have patent IV? yes    Patient in gown only? Dentures, contacts,  hearing aides, jewelry removed? yes    Ensure that pt has opportunity to void prior to being taken down.         Primary Nurse eSignature: Blayne Reis RN on 5/2/2019 at 5:19 AM      Co-Signer eSignature: Electronically signed by Virgen Montgomery RN on 5/2/19 at 8:20 AM

## 2019-05-02 NOTE — PROGRESS NOTES
Pt was on the schedule for a cardiac cath today and was NPO. At 1700, I was informed from the cath lab that his procedure had been cancelled and rescheduled for the morning.

## 2019-05-03 VITALS
DIASTOLIC BLOOD PRESSURE: 81 MMHG | OXYGEN SATURATION: 94 % | BODY MASS INDEX: 46.65 KG/M2 | SYSTOLIC BLOOD PRESSURE: 143 MMHG | HEIGHT: 69 IN | HEART RATE: 66 BPM | RESPIRATION RATE: 23 BRPM | WEIGHT: 315 LBS | TEMPERATURE: 98 F

## 2019-05-03 PROBLEM — I49.5 SINOATRIAL NODE DYSFUNCTION (HCC): Status: ACTIVE | Noted: 2019-05-03

## 2019-05-03 PROBLEM — I48.0 PAF (PAROXYSMAL ATRIAL FIBRILLATION) (HCC): Status: ACTIVE | Noted: 2019-04-30

## 2019-05-03 LAB
LV EF: 60 %
LVEF MODALITY: NORMAL

## 2019-05-03 PROCEDURE — C1760 CLOSURE DEV, VASC: HCPCS

## 2019-05-03 PROCEDURE — C1894 INTRO/SHEATH, NON-LASER: HCPCS

## 2019-05-03 PROCEDURE — 93017 CV STRESS TEST TRACING ONLY: CPT

## 2019-05-03 PROCEDURE — 6360000002 HC RX W HCPCS

## 2019-05-03 PROCEDURE — 6370000000 HC RX 637 (ALT 250 FOR IP): Performed by: INTERNAL MEDICINE

## 2019-05-03 PROCEDURE — 93458 L HRT ARTERY/VENTRICLE ANGIO: CPT | Performed by: INTERNAL MEDICINE

## 2019-05-03 PROCEDURE — 6360000004 HC RX CONTRAST MEDICATION: Performed by: INTERNAL MEDICINE

## 2019-05-03 PROCEDURE — 2580000003 HC RX 258: Performed by: INTERNAL MEDICINE

## 2019-05-03 PROCEDURE — 2709999900 HC NON-CHARGEABLE SUPPLY

## 2019-05-03 PROCEDURE — 4A023N7 MEASUREMENT OF CARDIAC SAMPLING AND PRESSURE, LEFT HEART, PERCUTANEOUS APPROACH: ICD-10-PCS | Performed by: INTERNAL MEDICINE

## 2019-05-03 PROCEDURE — B2111ZZ FLUOROSCOPY OF MULTIPLE CORONARY ARTERIES USING LOW OSMOLAR CONTRAST: ICD-10-PCS | Performed by: INTERNAL MEDICINE

## 2019-05-03 PROCEDURE — 99238 HOSP IP/OBS DSCHRG MGMT 30/<: CPT | Performed by: INTERNAL MEDICINE

## 2019-05-03 PROCEDURE — B2151ZZ FLUOROSCOPY OF LEFT HEART USING LOW OSMOLAR CONTRAST: ICD-10-PCS | Performed by: INTERNAL MEDICINE

## 2019-05-03 RX ORDER — SODIUM CHLORIDE 0.9 % (FLUSH) 0.9 %
10 SYRINGE (ML) INJECTION EVERY 12 HOURS SCHEDULED
Status: DISCONTINUED | OUTPATIENT
Start: 2019-05-03 | End: 2019-05-03 | Stop reason: HOSPADM

## 2019-05-03 RX ORDER — SODIUM CHLORIDE 0.9 % (FLUSH) 0.9 %
10 SYRINGE (ML) INJECTION PRN
Status: DISCONTINUED | OUTPATIENT
Start: 2019-05-03 | End: 2019-05-03 | Stop reason: HOSPADM

## 2019-05-03 RX ORDER — SOTALOL HYDROCHLORIDE 80 MG/1
80 TABLET ORAL 2 TIMES DAILY
Qty: 60 TABLET | Refills: 3 | Status: SHIPPED | OUTPATIENT
Start: 2019-05-03 | End: 2019-08-19 | Stop reason: SDUPTHER

## 2019-05-03 RX ADMIN — ASPIRIN 81 MG: 81 TABLET ORAL at 09:29

## 2019-05-03 RX ADMIN — PANTOPRAZOLE SODIUM 40 MG: 40 TABLET, DELAYED RELEASE ORAL at 06:43

## 2019-05-03 RX ADMIN — ALLOPURINOL 300 MG: 300 TABLET ORAL at 09:29

## 2019-05-03 RX ADMIN — SOTALOL HYDROCHLORIDE 80 MG: 80 TABLET ORAL at 09:29

## 2019-05-03 RX ADMIN — HYDROCHLOROTHIAZIDE 25 MG: 25 TABLET ORAL at 09:29

## 2019-05-03 RX ADMIN — MULTIPLE VITAMINS W/ MINERALS TAB 1 TABLET: TAB at 09:29

## 2019-05-03 RX ADMIN — APIXABAN 5 MG: 5 TABLET, FILM COATED ORAL at 09:29

## 2019-05-03 RX ADMIN — LOSARTAN POTASSIUM 50 MG: 50 TABLET ORAL at 09:29

## 2019-05-03 RX ADMIN — NIFEDIPINE 90 MG: 90 TABLET, FILM COATED, EXTENDED RELEASE ORAL at 09:29

## 2019-05-03 RX ADMIN — SODIUM CHLORIDE: 9 INJECTION, SOLUTION INTRAVENOUS at 08:30

## 2019-05-03 RX ADMIN — FENOFIBRATE 160 MG: 160 TABLET ORAL at 06:43

## 2019-05-03 RX ADMIN — IOPAMIDOL 72 ML: 612 INJECTION, SOLUTION INTRAVENOUS at 10:35

## 2019-05-03 ASSESSMENT — PAIN SCALES - GENERAL
PAINLEVEL_OUTOF10: 0

## 2019-05-03 NOTE — PROGRESS NOTES
[address]    Daily HOSPITAL Progress Note                            Date:  5/2/19  Patient: Sugar Patiño  Admission:  4/30/2019 12:59 PM  Admit DX: A-fib ([address] [I48.91]  Age:  62 y. o., 1960        Date of Admission 4/30/2019 12:59 PM   Hospital Length of Stay  LOS: 2 days            I personally saw the patient and rounded with:  Earle Hagan RN Nurse Navigator on 5/2/19      The observations documented in this note, including the assessment and plan are mine         Reason for initial evaluation or the patient's initial complaint    Atrial flutter      SUBJECTIVE:      Chief Complaint / Reason for the Visit   Follow up of:  Atrial flutter and + tenzin and systemic arterial hypertension    Family present and in room during examination:  Yes: wife      Specialty Problems        Cardiology Problems    Hypertension        [facility])              Current Status Today According to the patient:  \"ok\"    Subjective:  Mr. Sugar Patiño is generally feeling unchanged. + lexiscan    Mr. Sugar Patiño has the following cardiac complaints / symptoms today:    1. AFL, on betapace    2. + tenzin, for cath    3.  Hypertension    The blood pressure for the lastr 36 hours has been:  Systolic (42LBR), HVJ:932 , Min:99 , HTM:943    Diastolic (59WKK), TFJ:13, Min:49, Max:91        Sugar Patiño is a 62 y.o. male with the following history as recorded in EpicCare:    Patient Active Problem List    Diagnosis Date Noted    Hypertension      Priority: High    Dyspnea 09/20/2018     Priority: High    SOB (shortness of breath) 07/17/2013     Priority: Low    A-fib (HCC) 04/30/2019     Current Facility-Administered Medications   Medication Dose Route Frequency Provider Last Rate Last Dose    0.9 % sodium chloride infusion   Intravenous Continuous Nahoym Ward MD 15 mL/hr at 05/01/19 0902      sodium chloride flush 0.9 % injection 10 mL  10 mL Intravenous 2 times per day Nahomy Ward MD   10 mL at 05/02/19 0856    sodium chloride flush 0.9 % injection 10 mL  10 mL Intravenous PRN Cristian Reyna MD        sotalol (BETAPACE) tablet 80 mg  80 mg Oral BID Cristian Reyna MD   80 mg at 05/02/19 0901    apixaban (ELIQUIS) tablet 5 mg  5 mg Oral BID Cristian Reyna MD   Stopped at 05/02/19 0947    albuterol sulfate  (90 Base) MCG/ACT inhaler 2 puff  2 puff Inhalation Q6H PRN Cristian Reyna MD        allopurinol (ZYLOPRIM) tablet 300 mg  300 mg Oral Daily Cristian Reyna MD   300 mg at 05/02/19 0834    aspirin EC tablet 81 mg  81 mg Oral Daily Cristian Reyna MD   81 mg at 05/02/19 0834    carvedilol (COREG) tablet 25 mg  25 mg Oral BID CLEMENTE Reyna MD   25 mg at 05/02/19 1635    fenofibrate tablet 160 mg  160 mg Oral QAM  Cristian Reyna MD   160 mg at 05/02/19 0623    fluticasone (FLONASE) 50 MCG/ACT nasal spray 1 spray  1 spray Each Nare PRN Cristian Reyna MD        hydrochlorothiazide (HYDRODIURIL) tablet 25 mg  25 mg Oral Daily Cristian Reyna MD   25 mg at 05/02/19 0835    losartan (COZAAR) tablet 50 mg  50 mg Oral BID Cristian Reyna MD   50 mg at 05/02/19 0836    metFORMIN (GLUCOPHAGE) tablet 500 mg  500 mg Oral BID CLEMENTE Reyna MD   500 mg at 04/30/19 1651    montelukast (SINGULAIR) tablet 10 mg  10 mg Oral Nightly Cristian Reyna MD   10 mg at 05/01/19 2037    therapeutic multivitamin-minerals 1 tablet  1 tablet Oral Daily Cristian Reyna MD   1 tablet at 05/02/19 0834    NIFEdipine (PROCARDIA XL) extended release tablet 90 mg  90 mg Oral Daily Cristian Reyna MD   90 mg at 05/02/19 0834    pantoprazole (PROTONIX) tablet 40 mg  40 mg Oral BID AC Cristian Reyna MD   40 mg at 05/02/19 1636    acetaminophen (TYLENOL) tablet 650 mg  650 mg Oral Q4H PRN Cristian Reyna MD        ondansetron [facility]) injection 4 mg  4 mg Intravenous Q6H PRN Cristian Reyna MD        docusate sodium (COLACE) capsule 100 mg  100 mg Oral Daily PRN Cristian Reyna MD         Allergies: Levaquin [levofloxacin] and Penicillins  Past Medical History:   Diagnosis Date    Arthritis     CPAP (continuous positive airway pressure) dependence     Diabetes mellitus (HCC)     GERD (gastroesophageal reflux disease)     Gout     Hyperlipidemia     Hypertension     Sleep apnea     SOB (shortness of breath) 7/17/2013     Past Surgical History:   Procedure Laterality Date    BACK SURGERY      CHOLECYSTECTOMY      JOINT REPLACEMENT Bilateral     knees 2016     Family History   Problem Relation Age of Onset    Cancer Mother     Coronary Art Dis Father     Heart Attack Father     Heart Disease Father     High Blood Pressure Father     High Cholesterol Father     No Known Problems Sister     No Known Problems Brother     No Known Problems Brother      Social History     Tobacco Use    Smoking status: Never Smoker    Smokeless tobacco: Never Used   Substance Use Topics    Alcohol use: No          Review of Systems:    General:      Complaint / Symptom Yes / No / Description if Yes       Fatigue Yes:  chronic   Weight gain NA   Insomnia NA       Respiratory:        Complaint / Symptom Yes / No / Description if Yes       Cough No   Horseness NA       Cardiovascular:    Complaint / Symptom Yes / No / Description if Yes       Chest Pain No   Shortness of Air / Orthopnea Yes: chronic and stable   Presyncope / Syncope No   Palpitations No         Objective:    BP (!) 144/77   Pulse 74   Temp 98.7 °F (37.1 °C) (Temporal)   Resp 22   Ht 5' 9\" (1.753 m)   Wt (!) 338 lb (153.3 kg)   SpO2 94%   BMI 49.91 kg/m² ,     Intake/Output Summary (Last 24 hours) at 5/2/2019 1938  Last data filed at 5/2/2019 1800  Gross per 24 hour   Intake 330 ml   Output 2650 ml   Net -2320 ml       GENERAL - well developed and well nourished, is an active participant in this examination  HEENT -  PERRLA, Hearing appears normal, conjunctiva and lids are normal, ears and nose appear normal  NECK - no thyromegaly, no JVD, trachea is in the midline  CARDIOVASCULAR - PMI is in the left mid line clavicular position, Normal S1 and S2 with a grade 1/6 systolic murmur. No S3 or S4    PULMONARY - No respiratory distress. scattered wheezes and rales. Breath sounds in both  lung fields are Decreased  ABDOMEN  - soft, non tender, no rebound, no hepatomegaly or splenomegaly  MUSCULOSKELETAL  - Prone/Supine, digitals and nails are without clubbing or cyanosis  EXTREMITIES - trace edema  NEUROLOGIC - cranial nerves, II-XII, are normal  SKIN - turgor is normal, no rash  PSYCHIATRIC - normal mood and affect, alert and orientated x 3, judgement and insight appear appropriate      ASSESSMENT:    ALL THE CARDIOLOGY PROBLEMS ARE LISTED ABOVE; HOWEVER, THE FOLLOWING SPECIFIC CARDIAC PROBLEMS / CONDITIONS WERE ADDRESSED AND TREATED DURING THE OFFICE VISIT TODAY:                                                                                            MEDICAL DECISION MAKING                   Cardiac Specific Problem / Diagnosis   Discussion and Data Reviewed Diagnostic Procedures Ordered Management Options Selected                 1. Presenting problem / symptom     paroxymal atrial fibrillation  show no change    The Atrial Fibrillation Stroke Risk is calculated according to the      [id number] Score        Risk   Factors   Component Value   C CHF No 0   H HTN Yes 1   A2 Age >= 75 No,  (59 y.o.) 0   D DM No 0   S2 Prior Stroke/TIA No 0   V Vascular Disease No 0   A Age 74-69 No,  (59 y.o.) 0   Sc Sex male 0     [id number]  Score   1   Score last updated 4/30/19 9:10 PM           [id number] Score Annual Stroke Risk %   0 0   1 1.3   2 2.2   3 3.2   4 4.0   5 6.7   6 9.8   7 9.6   8 12.5   9 15.2           Yes: DCCV Continue current medications:      Yes:                  2.  Prior cardiovascular evaluation Initial presentation during this evaluation    Review and summation of old records:     7/17/2013  SE  negative for myocardial ischemia [facility])  8/18/2018  SE  negative for myocardial ischemia [facility])  9/18/2018  Office visit with Dr. Franck Vasquez, persistent chest pain despite negative stress test, AUC indication 19, AUC score 7   9/20/18  Cath mild CAD, normal LVFX          No Continue current medications:     Yes: amiodarone and DCCV                 3. Systemic arterial hypertension Initial presentation during this evaluation Systolic (23DSX), OAS:047 , Min:116 , ATW:520    Diastolic (44ZNS), QAP:17, Min:77, Max:97    No Continue current medications:        yes                        Date of the Proposed Procedure:  05/03/19       Proposed Procedure:  Selective left heart and coronary arteriography with possible percutaneous coronary interventon, (femoral approach), 05/03/19       :  GUSTAVO Mathur MD     Indications:  5/1/19 lexiscan Positive for inferior lateralmyocardial ischemia, EF 60%, 4% ischemic myocardium on stress, low risk findings, AUC indication 15, AUC score 4, (MD Sidney)      American Society of Anesthesiologists (ASA) Classification:  III     Plan of Sedation:  Moderate Sedation     Mallampati Classification:  II     I have examined this patient on 5/2/2019  in CCU in the presence of Mary Womack RN Nurse Navigator  and find no interval changes since the original History and Physical / Consult as noted written by myself, on 4/30/2019     With the constellation of symptoms and these findings, I recommend cardiac catheterization and possibility of percutaneous coronary intervention.     I discussed with him  in detail  the risks, benefits and alternatives to this procedure. The risks mentioned to him include but are not limited to:  vascular complications in ~ 3%, stroke <1%, renal dysfunction <5%, myocardial infarction <1%, coronary dissection <1%, need for emergency open heart surgery <1, and death <1% .   He appeared to understand, had no questions, and agreed to proceed with this plan.     Additionally, there are risks associated with moderate sedation which includes transient drop in blood pressure and need for assisted ventilation.     This procedure is scheduled for 05/03/19 .     Crystal Perez MD

## 2019-05-03 NOTE — PROGRESS NOTES
[address]    Daily HOSPITAL Progress Note                            Date:  19  Patient: Karuna Carrillo  Admission:  2019 12:59 PM  [facility] [I48.91]  Age:  62 y. o., 1960        Date of Admission 2019 12:59 PM   Hospital Length of Stay  LOS: 2 days            I personally saw the patient and rounded with:  Priscilla Cook RN Nurse Navigator on 19      The observations documented in this note, including the assessment and plan are mine         Reason for initial evaluation or the patient's initial complaint    Atrial flutter      SUBJECTIVE:      Chief Complaint / Reason for the Visit   Follow up of:  Atrial flutter and + lexiscan and systemic arterial hypertension    Family present and in room during examination:  Yes: wife      Specialty Problems        Cardiology Problems    Hypertension        [facility])              Current Status Today According to the patient:  \"ok\"    Subjective:  Mr. Karuna Carrillo is generally feeling unchanged. Converted to NSR just prior to DCCV this am, now lexiscan is +    Mr. Karuna Carrillo has the following cardiac complaints / symptoms today:    1. AFL, as above, have stopped the dilt and amio and started betapace    2. + tenzin, for cath tomorrow    3.  Hypertension    The blood pressure for the lastr 36 hours has been:  Systolic (59NOW), YFA:637 , Min:99 , HP    Diastolic (62SUG), GP, Min:49, Max:91        Karuna Carrillo is a 62 y.o. male with the following history as recorded in EpicCare:    Patient Active Problem List    Diagnosis Date Noted    Hypertension      Priority: High    Dyspnea 2018     Priority: High    SOB (shortness of breath) 2013     Priority: Low    A-fib ([facility] 2019     Current Facility-Administered Medications   Medication Dose Route Frequency Provider Last Rate Last Dose    0.9 % sodium chloride infusion   Intravenous Continuous Drake Tejada MD 15 mL/hr at 19 2656      sodium chloride flush 0.9 % injection 10 mL  10 mL Intravenous 2 times per day Kike Donis MD   10 mL at 05/02/19 0856    sodium chloride flush 0.9 % injection 10 mL  10 mL Intravenous PRN Kike Donis MD        sotalol (BETAPACE) tablet 80 mg  80 mg Oral BID Kike Donis MD   80 mg at 05/02/19 0901    apixaban (ELIQUIS) tablet 5 mg  5 mg Oral BID Kike Donis MD   Stopped at 05/02/19 0947    albuterol sulfate  (90 Base) MCG/ACT inhaler 2 puff  2 puff Inhalation Q6H PRN Kike Donis MD        allopurinol (ZYLOPRIM) tablet 300 mg  300 mg Oral Daily Kike Donis MD   300 mg at 05/02/19 0834    aspirin EC tablet 81 mg  81 mg Oral Daily Kike Donis MD   81 mg at 05/02/19 0834    carvedilol (COREG) tablet 25 mg  25 mg Oral BID WC Kike Donis MD   25 mg at 05/02/19 1635    fenofibrate tablet 160 mg  160 mg Oral QAM AC Kike Donis MD   160 mg at 05/02/19 0623    fluticasone (FLONASE) 50 MCG/ACT nasal spray 1 spray  1 spray Each Nare PRN Kike Donis MD        hydrochlorothiazide (HYDRODIURIL) tablet 25 mg  25 mg Oral Daily Kike Donis MD   25 mg at 05/02/19 0835    losartan (COZAAR) tablet 50 mg  50 mg Oral BID Kike Donis MD   50 mg at 05/02/19 0836    metFORMIN (GLUCOPHAGE) tablet 500 mg  500 mg Oral BID WC Kike Donis MD   500 mg at 04/30/19 1651    montelukast (SINGULAIR) tablet 10 mg  10 mg Oral Nightly Kike Donis MD   10 mg at 05/01/19 2037    therapeutic multivitamin-minerals 1 tablet  1 tablet Oral Daily Kike Donis MD   1 tablet at 05/02/19 0834    NIFEdipine (PROCARDIA XL) extended release tablet 90 mg  90 mg Oral Daily Kike Donis MD   90 mg at 05/02/19 0834    pantoprazole (PROTONIX) tablet 40 mg  40 mg Oral BID AC Kike Donis MD   40 mg at 05/02/19 1636    acetaminophen (TYLENOL) tablet 650 mg  650 mg Oral Q4H PRN Kike Donis MD        [facility]) injection 4 mg  4 mg Intravenous Q6H PRN Kike Donis MD       [facility] docusate sodium (COLACE) capsule 100 mg  100 mg Oral Daily PRN Camden Dela Cruz MD         Allergies: Levaquin [levofloxacin] and Penicillins  Past Medical History:   Diagnosis Date    Arthritis     CPAP (continuous positive airway pressure) dependence     Diabetes mellitus (HCC)     GERD (gastroesophageal reflux disease)     Gout     Hyperlipidemia     Hypertension     Sleep apnea     SOB (shortness of breath) 7/17/2013     Past Surgical History:   Procedure Laterality Date    BACK SURGERY      CHOLECYSTECTOMY      JOINT REPLACEMENT Bilateral     knees 2016     Family History   Problem Relation Age of Onset    Cancer Mother     Coronary Art Dis Father     Heart Attack Father     Heart Disease Father     High Blood Pressure Father     High Cholesterol Father     No Known Problems Sister     No Known Problems Brother     No Known Problems Brother      Social History     Tobacco Use    Smoking status: Never Smoker    Smokeless tobacco: Never Used   Substance Use Topics    Alcohol use: No          Review of Systems:    General:      Complaint / Symptom Yes / No / Description if Yes       Fatigue Yes:  chronic   Weight gain NA   Insomnia NA       Respiratory:        Complaint / Symptom Yes / No / Description if Yes       Cough No   Horseness NA       Cardiovascular:    Complaint / Symptom Yes / No / Description if Yes       Chest Pain No   Shortness of Air / Orthopnea Yes: chronic and stable   Presyncope / Syncope No   Palpitations No         Objective:    BP (!) 144/77   Pulse 74   Temp 98.7 °F (37.1 °C) (Temporal)   Resp 22   Ht 5' 9\" (1.753 m)   Wt (!) 338 lb (153.3 kg)   SpO2 94%   BMI 49.91 kg/m² ,     Intake/Output Summary (Last 24 hours) at 5/2/2019 1933  Last data filed at 5/2/2019 1800  Gross per 24 hour   Intake 330 ml   Output 2650 ml   Net -2320 ml       GENERAL - well developed and well nourished, is an active participant in this examination  HEENT -  PERRLA, Hearing appears normal, conjunctiva and lids are normal, ears and nose appear normal  NECK - no thyromegaly, no JVD, trachea is in the midline  CARDIOVASCULAR - PMI is in the left mid line clavicular position, Normal S1 and S2 with a grade 1/6 systolic murmur. No S3 or S4    PULMONARY - No respiratory distress. scattered wheezes and rales. Breath sounds in both  lung fields are Decreased  ABDOMEN  - soft, non tender, no rebound, no hepatomegaly or splenomegaly  MUSCULOSKELETAL  - Prone/Supine, digitals and nails are without clubbing or cyanosis  EXTREMITIES - trace edema  NEUROLOGIC - cranial nerves, II-XII, are normal  SKIN - turgor is normal, no rash  PSYCHIATRIC - normal mood and affect, alert and orientated x 3, judgement and insight appear appropriate      ASSESSMENT:    ALL THE CARDIOLOGY PROBLEMS ARE LISTED ABOVE; HOWEVER, THE FOLLOWING SPECIFIC CARDIAC PROBLEMS / CONDITIONS WERE ADDRESSED AND TREATED DURING THE OFFICE VISIT TODAY:                                                                                            MEDICAL DECISION MAKING                   Cardiac Specific Problem / Diagnosis   Discussion and Data Reviewed Diagnostic Procedures Ordered Management Options Selected                 1. Presenting problem / symptom     paroxymal atrial fibrillation  show no change    The Atrial Fibrillation Stroke Risk is calculated according to the      [id number] Score        Risk   Factors   Component Value   C CHF No 0   H HTN Yes 1   A2 Age >= 75 No,  (59 y.o.) 0   D DM No 0   S2 Prior Stroke/TIA No 0   V Vascular Disease No 0   A Age 74-69 No,  (59 y.o.) 0   Sc Sex male 0     [id number]  Score   1   Score last updated 4/30/19 9:10 PM           [id number] Score Annual Stroke Risk %   0 0   1 1.3   2 2.2   3 3.2   4 4.0   5 6.7   6 9.8   7 9.6   8 12.5   9 15.2           Yes: DCCV Continue current medications:      Yes:                  2.  Prior cardiovascular evaluation Initial presentation during this questions, and agreed to proceed with this plan. Additionally, there are risks associated with moderate sedation which includes transient drop in blood pressure and need for assisted ventilation. This procedure is scheduled for 05/02/19 .     Camden Dela Cruz MD

## 2019-05-03 NOTE — PROCEDURES
Cardiac Risk Profile -       Risk Factor Yes / No / Unknown         Gender Male   Cigarette Use No   Family History of Cardiovascular Disease Yes: coronary art dis, heart attack, heart disease, high blood pressure, high cholesterol   Diabetes Mellitus yes   Hypercholesteremia yes   Hypertension yes       Prior Cardiac History -    7/17/2013  SE  negative for myocardial ischemia [facility])  8/18/2018  SE  negative for myocardial ischemia [facility])  9/18/2018  Office visit with Dr. Rubina Kaiser, persistent chest pain despite negative stress test, AUC indication 19, AUC score 7   9/20/18  Cath mild CAD, normal LVFX  5/1/19 lexiscan Positive for inferior lateral myocardial ischemia, EF 60%, 4% ischemic myocardium on stress, low risk findings, AUC indication 15, AUC score 4, Rene Silverman MD)   5/3/19  Cath  Mild CAD, normal LVFX    Indications for Cardiac Catheterization -  5/1/19 lexiscan Positive for inferior lateral myocardial ischemia, EF 60%, 4% ischemic myocardium on stress, low risk findings, AUC indication 15, AUC score 4, (MD Sidney) , Diagnostic Catheterization Appropriate Use Criteria Description, [facility] 2012;[id number]      Conscious Sedation Protocol Used During this Procedure -        Anesthesia: Moderate   Sedation: 0 mg Midazolam (Versed)  25 mcg Fentanyl   Start time: 7:48   Stop time: 7:57   ASA Class: III   Estimated blood loss < 5 ml           After obtaining informed written consent, the patient was brought to the catheterization laboratory where the right groin was prepared in the usual sterile fashion. The patient was monitored continuously with ECG, pulse oximetry, blood pressure monitoring and direct observation. Additionally, please review the \"Smiley Hemodynamic Procedure Report\", which is generated electronically through the [employer]. This report includes additional details regarding this procedure including, but not limited to:     1.  Patient Data,   2. Admission,   3. Procedure,   4. Hemodynamics,   5. Vital Signs,   6. Medications, including conscious sedation medications given during the procedure (as noted above),   7. Procedure Log,   8. Device Usage,   9. Signature Audit [place], and,   10. Signatures. It should be noted, that I sign the \"Signatures\" line electronically through the \"HPF Def Portals\" tab on my computer. 2% lidocaine was injected above the common femoral artery. Utilizing ultrasound assistance and the Seldinger technique, the common femoral artery was cannulated and bright red pulsatile blood returned. Using fluoroscopy guidance, the J-tip wire was placed in the common femoral artery. A 6-Fr sheath was inserted. Utilizing 6-[place] Edwin catheters, selective coronary arteriography was performed followed by left ventriculography. At this stage, the equipment was removed. A right femoral arteriogram was performed to ensure patency of the vessel so that a vascular access closure device could be deployed. A 6-[place] Mynx vascular access closure device was then inserted. Hemostasis was achieved. A sterile dressing was applied. He was taken to his room in stable and satisfactory condition. The results of the procedure were explained to the family in the cardiac catheterization laboratories consult / waiting room        Complications:  none      Results:    Hemodynamics:      Site Pressure mmHg        Left ventricular pressure 118/6 mmHg   Left ventricular end-diastolic pressure (LVEDP) 16 mmHg   Aortic pressure 114/68 mmHg   Mean aortic pressure 89 mmHg       Angiography:    Coronary Arteries:    Left Main Coronary Artery:  A large vessel which arises from the left sinus of Valsalva. It divides into the left anterior descending coronary artery and the left circumflex. There is mild diffuse disease throughout the entire length of the vessel.      Left Anterior Coronary Artery:  The LAD is a moderate sized vessel with several diagonal branches. There is mild diffuse disease throughout the entire length of the vessel. Left Circumflex Coronary Artery:  The LCx is a moderate sized dominant vessel with several marginal branches. There is mild diffuse disease throughout the entire length of the vessel. Right Coronary Artery:  The RCA is a small sized non dominant vessel which arises from the right sinus of Valsalva. There is mild diffuse disease throughout the entire length of the vessel. Left Ventriculogram:  The left ventriculogram is obtained in the right oblique projection. All regional wall segments move appropriately. The estimated visual ejection fraction is > 60%. There is no mitral regurgitation nor is there a pull back gradient seen across the aortic valve. Summary:      1. Successful femoral artery ultrasound  2. Successful femoral artery arteriogram  3. Supervision of the administration of moderate conscious sedation  4. Mild coronary artery disease  5. Left ventricular function is normal      RECOMMENDATIONS:    1.  Reassurance  2. Risk factor modification  3. Evaluation of etiologies of non cardiac chest discomfort should symptoms persist or progress  4.   Follow  Up with Primary care provider as arranged        Electronically signed by Cat Mittal MD on 5/3/19

## 2019-05-03 NOTE — PROGRESS NOTES
Pt discharged via wheelchair to the main entrance of the hospital. His wife was awaiting in the front to transport him to their private residence. Pt had his glasses and cell phone upon discharge.

## 2019-05-03 NOTE — DISCHARGE SUMMARY
[facility]    Discharge Summary          I personally saw the patient and rounded with:  Papo Hernandez, on  5/3/19      The observations documented in this note, including the assessment and plan are mine              Patient ID: Mercedes Eagle      Patient's PCP: Whitley Rose. Gato Clarke,     Admit Date: 4/30/2019     Discharge Date:  05/03/19     Admitting Physician:  Tonia Henry MD       Discharge Physician: Tonia Henry MD     Discharge Diagnoses:    1. Sinoatrial node dysfunction    5/2/2019  Converted AFL to NSR on amiodarone and dilt, placed on betapace 80 bid    2. Systemic arterial hypertension  3. Hypercholesteremia  4. Diabetes mellitus  5. Chest discomfort of ? Etiology, doubt myocardial ischemia, cardiac catheterization, 5/3/19, mild CAD, normal left ventricular function    7/17/2013  SE  negative for myocardial ischemia [facility])  8/18/2018  SE  negative for myocardial ischemia [facility])  9/18/2018  Office visit with Dr. Jennifer Cervantes, persistent chest pain despite negative stress test, AUC indication 19, AUC score 7   9/20/18  Cath mild CAD, normal LVFX  5/1/19 lexiscan Positive for inferior lateral myocardial ischemia, EF 60%, 4% ischemic myocardium on stress, low risk findings, AUC indication 15, AUC score 4, Estefani Garcia MD)   5/3/19  Cath  Mild CAD, normal LVFX      Cardiac Specific Diagnoses:    Specialty Problems        Cardiology Problems    Hypertension        Sinoatrial node dysfunction ([address]        A-fib ([address]                The patient was seen and examined on day of discharge and this discharge summary is in conjunction with any daily progress note from day of discharge. History of Present Illness:     Mercedes Eagle is a 62 y.o. male who presents to [facility] with symptoms / signs / problem or diagnosis of palpitatons / atrial fibrillation. The palpitations began this am about 1230 and lasted since then.   There were described as a pounding in the chest and were continuously, lasting hours. They were primarily located in the central chest.  When being examined this afternoon, he has had no symptoms of exertional chest discomfort, unusual or change in shortness of air, presyncope or syncope. Hospital Course: The patient was placed on amiodarone and diltiazem. After a brief period of time, the patient spontaneous, pharmacological converted to normal sinus rhythm. The patient was placed on Betapace 80 mg orally twice a day. He remained in normal sinus rhythm. The patient then had a lexiscan which was suggestive of myocardial ischemia. The patient underwent cardiac catheterization according to the following criteria:  5/1/19 lexiscan Positive for inferior lateral myocardial ischemia, EF 60%, 4% ischemic myocardium on stress, low risk findings, AUC indication 15, AUC score 4, Francisco Mccoy MD) , Diagnostic Catheterization Appropriate Use Criteria Description, [facility] 2012;[id number]:[id number]    Selective left heart and coronary arteriography was preformed, which revealed:    1. Successful femoral artery ultrasound  2. Successful femoral artery arteriogram  3. Supervision of the administration of moderate conscious sedation  4. Mild coronary artery disease  5. Left ventricular function is normal    There were no apparent complications. This was a most reassuring report and strongly suggested that the patient's chest discomfort was of low probability of representing myocardial ischemia and the lexiscan / cardiolyte, 5/1/2019  was falsely positive. The rest of the patient's hospitalization was uneventful. He was discharged home on medical therapy with outpatient follow up. Consults:     None      Significant Diagnostic Studies:    1. lexiscan / cardiolyte, 5/1/2019   2. Selective left heart and coronary arteriography (femoral approach), 5/3/2019       Significant Therapeutic Endeavors:      1.  Placement on amiodarone and diltiazem with pharmacological cardioversion, 5/2/2019  2. Intensification of medical management, 5/2/2019       Activity: activity as directed per discharge instructions. Diet:  Cardiac diet    Labs:  For convenience and continuity at follow-up the following most recent labs are provided:    CBC:    Lab Results   Component Value Date    WBC 9.2 05/01/2019    HGB 15.7 05/01/2019    HCT 45.4 05/01/2019     05/01/2019       Renal:    Lab Results   Component Value Date     05/01/2019    K 4.2 05/01/2019    K 3.9 04/30/2019     05/01/2019    CO2 25 05/01/2019    BUN 18 05/01/2019    CREATININE 1.1 05/01/2019    CALCIUM 9.5 05/01/2019         Discharge Medications:     Current Discharge Medication List           Details   sotalol (BETAPACE) 80 MG tablet Take 1 tablet by mouth 2 times daily  Qty: 60 tablet, Refills: 3              Details   allopurinol (ZYLOPRIM) 300 MG tablet Take 300 mg by mouth daily      losartan (COZAAR) 50 MG tablet Take 50 mg by mouth 2 times daily      montelukast (SINGULAIR) 10 MG tablet Take 10 mg by mouth nightly      fenofibrate micronized (LOFIBRA) 200 MG capsule Take 200 mg by mouth every morning (before breakfast)      NIFEdipine (PROCARDIA XL) 90 MG extended release tablet Take 90 mg by mouth daily      hydrochlorothiazide (HYDRODIURIL) 25 MG tablet Take 25 mg by mouth daily      azelastine (ASTELIN) 0.1 % nasal spray 1 spray by Nasal route 2 times daily Use in each nostril as directed       Misc Natural Products (OSTEO BI-FLEX ADV DOUBLE ST) TABS Take 1 tablet by mouth daily      Multiple Vitamins-Minerals (THERAPEUTIC MULTIVITAMIN-MINERALS) tablet Take 1 tablet by mouth daily      aspirin 81 MG EC tablet Take 81 mg by mouth daily      fluticasone (FLONASE) 50 MCG/ACT nasal spray 1 spray by Each Nare route as needed       omeprazole (PRILOSEC) 40 MG delayed release capsule Take 40 mg by mouth daily      metFORMIN (GLUCOPHAGE) 500 MG tablet HOLD METFORMIN FOR 48 HOURS AFTER THE CARDIAC CATHETERIZATION  Qty: 60 tablet, Refills: 3      albuterol sulfate  (90 Base) MCG/ACT inhaler Inhale 2 puffs into the lungs every 6 hours as needed for Wheezing               Condition At Discharge:  Improved    Disposition:      1. Home  2. Follow up with cardiology as arranged  3. Follow up with primary care provider as arranged      [place] with me to discharge after the bedrest is complete, hemostatis is achieved, the patient is hemodynamically stable and comfortable. Please remind the patient that driving is absolutely prohibited for the next day (24 hours) after this procedure. Additionally, caution should be exercised to avoid heights, swimming, tub baths, open flames, or heavy machinery.         Electronically signed by Tammy Kruger MD on 5/3/19

## 2019-05-07 LAB
EKG P AXIS: 33 DEGREES
EKG P AXIS: 57 DEGREES
EKG P-R INTERVAL: 158 MS
EKG P-R INTERVAL: 172 MS
EKG Q-T INTERVAL: 448 MS
EKG Q-T INTERVAL: 450 MS
EKG QRS DURATION: 102 MS
EKG QRS DURATION: 94 MS
EKG QTC CALCULATION (BAZETT): 453 MS
EKG QTC CALCULATION (BAZETT): 458 MS
EKG T AXIS: 73 DEGREES
EKG T AXIS: 82 DEGREES

## 2019-06-14 ENCOUNTER — OFFICE VISIT (OUTPATIENT)
Dept: CARDIOLOGY | Age: 59
End: 2019-06-14
Payer: COMMERCIAL

## 2019-06-14 VITALS
DIASTOLIC BLOOD PRESSURE: 80 MMHG | SYSTOLIC BLOOD PRESSURE: 130 MMHG | HEART RATE: 58 BPM | WEIGHT: 315 LBS | BODY MASS INDEX: 46.65 KG/M2 | HEIGHT: 69 IN

## 2019-06-14 DIAGNOSIS — I48.0 PAF (PAROXYSMAL ATRIAL FIBRILLATION) (HCC): Primary | ICD-10-CM

## 2019-06-14 DIAGNOSIS — I10 ESSENTIAL HYPERTENSION: ICD-10-CM

## 2019-06-14 PROCEDURE — 93000 ELECTROCARDIOGRAM COMPLETE: CPT | Performed by: CLINICAL NURSE SPECIALIST

## 2019-06-14 PROCEDURE — 99213 OFFICE O/P EST LOW 20 MIN: CPT | Performed by: CLINICAL NURSE SPECIALIST

## 2019-06-14 NOTE — PATIENT INSTRUCTIONS
Follow up in 6 mos With Dr. Darnell Virgen   Call with any questions or concerns  Follow up with Mariano Ramirez. Myron De, DO, DO for non cardiac problems  Report any new problems  Cardiovascular Fitness-Exercise as tolerated. Strive for 30 minutes of exercise most days of the week. Cardiac / Healthy Diet  Continue current medications as directed  Continue plan of treatment  It is always recommended that you bring your medications bottles with you to each visit - this is for your safety! Patient Education        Learning About Atrial Fibrillation  What is atrial fibrillation? Atrial fibrillation (say \"AY-tree-albert fdr-rbkp-HRM-shun\") is the most common type of irregular heartbeat (arrhythmia). Normally, the heart beats in a strong, steady rhythm. In atrial fibrillation, a problem with the heart's electrical system causes the two upper parts of the heart (the atria) to quiver, or fibrillate. Your heart rate also may be faster than normal.  Atrial fibrillation can be dangerous because if the heartbeat isn't strong and steady, blood can collect, or pool, in the atria. And pooled blood is more likely to form clots. Clots can travel to the brain, block blood flow, and cause a stroke. Atrial fibrillation can also lead to heart failure. Treatment for atrial fibrillation helps prevent stroke and heart failure. It also helps relieve symptoms. Atrial fibrillation is often caused by another heart problem. It may happen after heart surgery. It may also be caused by other problems, such as an overactive thyroid gland or lung disease. Many people with atrial fibrillation are able to live full and active lives. What are the symptoms? Some people feel symptoms when they have episodes of atrial fibrillation. But other people don't notice any symptoms. If you have symptoms, you may feel:  · A fluttering, racing, or pounding feeling in your chest called palpitations. · Weak or tired. · Dizzy or lightheaded.   · Short of breath. · Chest pain. · Confused. You may notice signs of atrial fibrillation when you check your pulse. Your pulse may seem uneven or fast.  What can you expect when you have atrial fibrillation? At first, spells of atrial fibrillation may come on suddenly and last a short time. It may go away on its own or it goes away after treatment. This is called paroxysmal atrial fibrillation. Over time, the spells may last longer and occur more often. They often don't go away on their own. How is it treated? Treatments can help you feel better and prevent future problems, especially stroke and heart failure. The main types of treatment slow the heart rate, control the heart rhythm, and help prevent stroke. Your treatment will depend on the cause of your atrial fibrillation, your symptoms, and your risk for stroke. · Heart rate treatment. Medicine may be used to slow your heart rate. Your heartbeat may still be irregular. But these medicines keep your heart from beating too fast. They may also help relieve your symptoms. · Heart rhythm treatment. Different treatments may be used to try to stop atrial fibrillation and keep it from returning. They can also relieve symptoms. These treatments include medicine, electrical cardioversion to shock the heart back to a normal rhythm, a procedure called catheter ablation, and heart surgery. · Stroke prevention. You and your doctor can decide how to lower your risk. You may decide to take a blood-thinning medicine, such as aspirin or an anticoagulant. How can you live well with it? You can live well and help manage atrial fibrillation by having a heart-healthy lifestyle. This lifestyle may help reduce how often you have episodes of atrial fibrillation. If you are overweight, losing weight can help relieve symptoms. To have a heart-healthy lifestyle:  · Don't smoke. · Eat heart-healthy foods. · Be active.  Talk to your doctor about what type and level of exercise is safe for you. · Stay at a healthy weight. Lose weight if you need to. · Avoid alcohol if it triggers symptoms. · Manage other health problems such as high blood pressure, high cholesterol, and diabetes. · Avoid getting sick from the flu. Get a flu shot every year. · Manage stress. Where can you learn more? Go to [email or web address] org and sign in to your MeSixty account. Enter D752 in the Mediamind box to learn more about \"Learning About Atrial Fibrillation. \"     If you do not have an account, please click on the \"Sign Up Now\" link. Current as of: July 22, 2018  Content Version: 12.0  © 0960-2676 Healthwise, Incorporated. Care instructions adapted under license by [facility] ([facility]). If you have questions about a medical condition or this instruction, always ask your healthcare professional. [address] any warranty or liability for your use of this information.

## 2019-06-14 NOTE — PROGRESS NOTES
[address], [address]  Phone: [phone]  Fax: [phone]    OFFICE VISIT:  6/14/2019    Alvin Ramirez Ne: 1960    Reason For Visit:  Sharon Whyte is a 62 y.o. male who is here for Follow-Up from Hospital (No cardiac symptoms since cardioverion) and Atrial Fibrillation  Presented to hospital with atrial fibrillation. Was given IV amiodarone and diltiazem. He is in normal sinus rhythm. He was started on Betapace 80 mg twice a day. Had positive Lexiscan underwent heart catheterization that showed normal LV systolic function. Had mild nonocclusive coronary disease    Turns today in follow-up. He states he has not noticed any recurrent arrhythmia. He is active and doing well. 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 Does have some peripheral edema by the end of the day    Subjective  Sharon Whyte denies exertional chest pain, shortness of breath, orthopnea, paroxysmal nocturnal dyspnea, syncope, presyncope, arrhythmia, and fatigue. The patient denies numbness or weakness to suggest cerebrovascular accident or transient ischemic attack. Mikayla Lobato, DO, DO is PCP and follows labs including's. States have been well controlled.   Jazmine Dasilva has the following history as recorded in [facility]:    Patient Active Problem List    Diagnosis Date Noted    Hypertension      Priority: High    Dyspnea 09/20/2018     Priority: High    Sinoatrial node dysfunction (HCC) 05/03/2019    PAF (paroxysmal atrial fibrillation) ([address] 04/30/2019    SOB (shortness of breath) 07/17/2013     Past Medical History:   Diagnosis Date    Arthritis     CPAP (continuous positive airway pressure) dependence     Diabetes mellitus ([address]     GERD (gastroesophageal reflux disease)     Gout     Hyperlipidemia     Hypertension     Sinoatrial node dysfunction ([address] 5/3/2019    5/2/2019  Converted AFL to NSR on amiodarone and dilt, placed on betapace 80 bid    Sleep apnea     SOB (shortness of breath) 7/17/2013     Past Surgical History:   Procedure Laterality Date    BACK SURGERY      CHOLECYSTECTOMY      JOINT REPLACEMENT Bilateral     knees 2016     Family History   Problem Relation Age of Onset    Cancer Mother     Coronary Art Dis Father     Heart Attack Father     Heart Disease Father     High Blood Pressure Father     High Cholesterol Father     No Known Problems Sister     No Known Problems Brother     No Known Problems Brother      Social History     Tobacco Use    Smoking status: Never Smoker    Smokeless tobacco: Never Used   Substance Use Topics    Alcohol use: No      Current Outpatient Medications   Medication Sig Dispense Refill    sotalol (BETAPACE) 80 MG tablet Take 1 tablet by mouth 2 times daily 60 tablet 3    allopurinol (ZYLOPRIM) 300 MG tablet Take 300 mg by mouth daily      losartan (COZAAR) 50 MG tablet Take 50 mg by mouth 2 times daily      montelukast (SINGULAIR) 10 MG tablet Take 10 mg by mouth nightly      fenofibrate micronized (LOFIBRA) 200 MG capsule Take 200 mg by mouth every morning (before breakfast)      NIFEdipine (PROCARDIA XL) 90 MG extended release tablet Take 90 mg by mouth daily      hydrochlorothiazide (HYDRODIURIL) 25 MG tablet Take 25 mg by mouth daily      azelastine (ASTELIN) 0.1 % nasal spray 1 spray by Nasal route 2 times daily Use in each nostril as directed       albuterol sulfate  (90 Base) MCG/ACT inhaler Inhale 2 puffs into the lungs every 6 hours as needed for Wheezing      Misc Natural Products (OSTEO BI-FLEX ADV DOUBLE ST) TABS Take 1 tablet by mouth daily      Multiple Vitamins-Minerals (THERAPEUTIC MULTIVITAMIN-MINERALS) tablet Take 1 tablet by mouth daily      aspirin 81 MG EC tablet Take 81 mg by mouth daily      fluticasone (FLONASE) 50 MCG/ACT nasal spray 1 spray by Each Nare route as needed       omeprazole (PRILOSEC) 40 MG delayed release capsule Take 40 mg by mouth daily      metFORMIN (GLUCOPHAGE) 500 MG tablet HOLD METFORMIN FOR 48 HOURS AFTER THE CARDIAC CATHETERIZATION 60 tablet 3     No current facility-administered medications for this visit. Allergies: Levaquin [levofloxacin] and Penicillins    Review of Systems  Constitutional - no significant activity change, appetite change, or unexpected weight change. No fever, chills or diaphoresis. No fatigue. HEENT - no significant rhinorrhea or epistaxis. No tinnitus or significant hearing loss. Eyes - no sudden vision change or amaurosis. Respiratory - no significant wheezing, stridor, apnea or cough. No dyspnea on exertion or shortness of breath. Cardiovascular - no exertional chest pain, orthopnea or PND. No sensation of arrhythmia or slow heart rate. No claudication or leg edema. Gastrointestinal - no abdominal swelling or pain. No blood in stool. No severe constipation, diarrhea, nausea, or vomiting. Genitourinary - no difficulty urinating, dysuria, frequency, or urgency. No flank pain or hematuria. Musculoskeletal - no back pain, gait disturbance, or myalgia. Skin - no color change or rash. No pallor. No new surgical incision. Neurologic - no speech difficulty, facial asymmetry or lateralizing weakness. No seizures, presyncope, syncope, or significant dizziness. Hematologic - no easy bruising or excessive bleeding. Psychiatric - no severe anxiety or insomnia. No confusion. All other review of systems are negative. Objective  Vital Signs - /80   Pulse 58   Ht 5' 9\" (1.753 m)   Wt (!) 347 lb (157.4 kg)   BMI 51.24 kg/m²   General - Alray Leather is alert, cooperative, and pleasant. Well groomed. No acute distress. Body habitus isobese. HEENT - The head is normocephalic. No circumoral cyanosis. Dentition is normal.   EYES -  No Xanthelasma, no arcus senilis, no conjunctival hemorrhages or discharge. Neck - Supple, without increased jugular venous pressures.   No carotid bruits. No mass. Respiratory - Lungs are clear bilaterally. No wheezes or rales. Normal effort without use of accessory muscles. Cardiovascular - Heart has regular rhythm and rate. No murmurs, rubs or gallops. + pedal pulses and no varicosities. Abdominal -  Soft, nontender, nondistended. Bowel sounds are intact. Extremities - No clubbing, cyanosis, or  edema. Musculoskeletal -  No clubbing . No Osler's nodes. Gait normal .  No kyphosis or scoliosis. Skin -  no statis ulcers or dermatitis. Neurological - No focal signs are identified. Oriented to person, place and time. Psychiatric -  Appropriate affect and mood. Assessment:     Diagnosis Orders   1. PAF (paroxysmal atrial fibrillation) ([facility])  EKG 12 Lead   2. Essential hypertension       Data:  BP Readings from Last 3 Encounters:   06/14/19 130/80   05/03/19 (!) 143/81   09/20/18 132/76    Pulse Readings from Last 3 Encounters:   06/14/19 58   05/03/19 66   09/20/18 67        Wt Readings from Last 3 Encounters:   06/14/19 (!) 347 lb (157.4 kg)   05/03/19 (!) 338 lb 12.8 oz (153.7 kg)   09/20/18 (!) 330 lb (149.7 kg)       Today shows normal sinus rhythm with a rate of 58. QTc 31 ms  Rate and rhythm controlled on Betapace  Not noticed any recurrent arrhythmias. He is not anticoagulated. We discussed calling should he have any sustained arrhythmias  Pressure elevated on arrival.  States at home has been 120 over 60s to 70  Monitors carefully   currently on HCTZ, procardia and losartan  Has CHUCK and does wear CPAP faithfully   States taking medications as prescribed  Stable cardiovascular status. No evidence of overt heart failure, angina or dysrhythmia. Plan    Follow up in 6 mos With Dr. Rachele Bangura   Call with any questions or concerns  Follow up with Demetria Ly, DO, DO for non cardiac problems  Report any new problems  Cardiovascular Fitness-Exercise as tolerated.   Strive for 30 minutes of exercise most days of the week.    Cardiac / Healthy Diet  Continue current medications as directed  Continue plan of treatment  It is always recommended that you bring your medications bottles with you to each visit - this is for your safety! ANTHONY Silva dragon/transcription disclaimer: Much of this encounter note is electronic transcription/translation of spoken language to printed tach. Electronic translation of spoken language may be erroneous, or at times, nonsensical words or phrases may be inadvertently transcribed.  Although, I have reviewed the note for such errors, some may still exist.

## 2019-08-19 RX ORDER — SOTALOL HYDROCHLORIDE 80 MG/1
TABLET ORAL
Qty: 180 TABLET | Refills: 3 | Status: SHIPPED | OUTPATIENT
Start: 2019-08-19 | End: 2022-06-27 | Stop reason: SDUPTHER

## 2019-10-17 ENCOUNTER — OFFICE VISIT (OUTPATIENT)
Dept: CARDIOLOGY | Age: 59
End: 2019-10-17
Payer: COMMERCIAL

## 2019-10-17 VITALS
HEART RATE: 69 BPM | BODY MASS INDEX: 46.65 KG/M2 | SYSTOLIC BLOOD PRESSURE: 138 MMHG | DIASTOLIC BLOOD PRESSURE: 88 MMHG | HEIGHT: 69 IN | WEIGHT: 315 LBS

## 2019-10-17 DIAGNOSIS — I48.0 PAF (PAROXYSMAL ATRIAL FIBRILLATION) (HCC): Primary | ICD-10-CM

## 2019-10-17 PROCEDURE — 93000 ELECTROCARDIOGRAM COMPLETE: CPT | Performed by: CLINICAL NURSE SPECIALIST

## 2019-11-05 ENCOUNTER — TELEPHONE (OUTPATIENT)
Dept: CARDIOLOGY | Age: 59
End: 2019-11-05

## 2019-11-25 ENCOUNTER — OFFICE VISIT (OUTPATIENT)
Dept: CARDIOLOGY | Age: 59
End: 2019-11-25
Payer: COMMERCIAL

## 2019-11-25 VITALS
WEIGHT: 315 LBS | SYSTOLIC BLOOD PRESSURE: 130 MMHG | DIASTOLIC BLOOD PRESSURE: 70 MMHG | HEIGHT: 69 IN | BODY MASS INDEX: 46.65 KG/M2 | HEART RATE: 59 BPM

## 2019-11-25 DIAGNOSIS — I10 ESSENTIAL HYPERTENSION: ICD-10-CM

## 2019-11-25 DIAGNOSIS — I25.10 CORONARY ARTERY DISEASE INVOLVING NATIVE CORONARY ARTERY OF NATIVE HEART WITHOUT ANGINA PECTORIS: ICD-10-CM

## 2019-11-25 DIAGNOSIS — I48.0 PAF (PAROXYSMAL ATRIAL FIBRILLATION) (HCC): Primary | ICD-10-CM

## 2019-11-25 PROCEDURE — 93000 ELECTROCARDIOGRAM COMPLETE: CPT | Performed by: NURSE PRACTITIONER

## 2019-11-25 PROCEDURE — 99214 OFFICE O/P EST MOD 30 MIN: CPT | Performed by: NURSE PRACTITIONER

## 2019-11-25 PROCEDURE — 0296T PR EXT ECG > 48HR TO 21 DAY RCRD W/CONECT INTL RCRD: CPT | Performed by: NURSE PRACTITIONER

## 2019-11-25 RX ORDER — ASPIRIN 325 MG
325 TABLET ORAL DAILY
COMMUNITY
End: 2019-11-25

## 2019-11-25 ASSESSMENT — ENCOUNTER SYMPTOMS
TROUBLE SWALLOWING: 0
COUGH: 0
VOMITING: 0
ABDOMINAL DISTENTION: 0
EYE DISCHARGE: 0
EYE REDNESS: 0
WHEEZING: 0
COLOR CHANGE: 0
NAUSEA: 0
BLOOD IN STOOL: 0
FACIAL SWELLING: 0
ABDOMINAL PAIN: 0
SHORTNESS OF BREATH: 0

## 2019-12-03 ENCOUNTER — TELEPHONE (OUTPATIENT)
Dept: CARDIOLOGY | Age: 59
End: 2019-12-03

## 2019-12-03 DIAGNOSIS — I10 ESSENTIAL HYPERTENSION: ICD-10-CM

## 2019-12-03 DIAGNOSIS — I48.0 PAF (PAROXYSMAL ATRIAL FIBRILLATION) (HCC): ICD-10-CM

## 2019-12-19 ENCOUNTER — OFFICE VISIT (OUTPATIENT)
Dept: CARDIOLOGY | Age: 59
End: 2019-12-19
Payer: COMMERCIAL

## 2019-12-19 VITALS
HEIGHT: 69 IN | BODY MASS INDEX: 46.65 KG/M2 | DIASTOLIC BLOOD PRESSURE: 82 MMHG | WEIGHT: 315 LBS | SYSTOLIC BLOOD PRESSURE: 124 MMHG | HEART RATE: 67 BPM

## 2019-12-19 DIAGNOSIS — I48.0 PAF (PAROXYSMAL ATRIAL FIBRILLATION) (HCC): Primary | ICD-10-CM

## 2019-12-19 DIAGNOSIS — E66.01 CLASS 3 SEVERE OBESITY WITH BODY MASS INDEX (BMI) OF 50.0 TO 59.9 IN ADULT, UNSPECIFIED OBESITY TYPE, UNSPECIFIED WHETHER SERIOUS COMORBIDITY PRESENT (HCC): ICD-10-CM

## 2019-12-19 DIAGNOSIS — I25.10 CORONARY ARTERY DISEASE INVOLVING NATIVE CORONARY ARTERY OF NATIVE HEART WITHOUT ANGINA PECTORIS: ICD-10-CM

## 2019-12-19 DIAGNOSIS — I10 ESSENTIAL HYPERTENSION: ICD-10-CM

## 2019-12-19 PROCEDURE — 99213 OFFICE O/P EST LOW 20 MIN: CPT | Performed by: NURSE PRACTITIONER

## 2019-12-19 ASSESSMENT — ENCOUNTER SYMPTOMS
FACIAL SWELLING: 0
BLOOD IN STOOL: 0
WHEEZING: 0
VOMITING: 0
EYE REDNESS: 0
COLOR CHANGE: 0
COUGH: 0
NAUSEA: 0
TROUBLE SWALLOWING: 0
ABDOMINAL DISTENTION: 0
EYE DISCHARGE: 0
SHORTNESS OF BREATH: 0
ABDOMINAL PAIN: 0

## 2020-07-06 ENCOUNTER — OFFICE VISIT (OUTPATIENT)
Dept: CARDIOLOGY | Age: 60
End: 2020-07-06
Payer: COMMERCIAL

## 2020-07-06 ENCOUNTER — TELEPHONE (OUTPATIENT)
Dept: CARDIOLOGY | Age: 60
End: 2020-07-06

## 2020-07-06 VITALS
DIASTOLIC BLOOD PRESSURE: 88 MMHG | WEIGHT: 315 LBS | SYSTOLIC BLOOD PRESSURE: 138 MMHG | OXYGEN SATURATION: 99 % | HEART RATE: 59 BPM | BODY MASS INDEX: 46.65 KG/M2 | HEIGHT: 69 IN

## 2020-07-06 PROBLEM — E66.01 CLASS 3 SEVERE OBESITY WITH BODY MASS INDEX (BMI) OF 45.0 TO 49.9 IN ADULT (HCC): Status: ACTIVE | Noted: 2020-07-06

## 2020-07-06 PROBLEM — I25.10 MILD CORONARY ARTERY DISEASE: Status: ACTIVE | Noted: 2020-07-06

## 2020-07-06 PROBLEM — E66.813 CLASS 3 SEVERE OBESITY WITH BODY MASS INDEX (BMI) OF 45.0 TO 49.9 IN ADULT: Status: ACTIVE | Noted: 2020-07-06

## 2020-07-06 PROCEDURE — 99213 OFFICE O/P EST LOW 20 MIN: CPT | Performed by: NURSE PRACTITIONER

## 2020-07-06 PROCEDURE — 93000 ELECTROCARDIOGRAM COMPLETE: CPT | Performed by: NURSE PRACTITIONER

## 2020-07-06 RX ORDER — ALBUTEROL SULFATE 90 UG/1
2 AEROSOL, METERED RESPIRATORY (INHALATION) EVERY 4 HOURS PRN
COMMUNITY

## 2020-07-06 RX ORDER — APIXABAN 5 MG/1
5 TABLET, FILM COATED ORAL 2 TIMES DAILY
COMMUNITY
Start: 2020-06-14 | End: 2021-03-16 | Stop reason: SDUPTHER

## 2020-07-06 ASSESSMENT — ENCOUNTER SYMPTOMS
EYE REDNESS: 0
ABDOMINAL PAIN: 0
COLOR CHANGE: 0
ABDOMINAL DISTENTION: 0
EYE DISCHARGE: 0
VOMITING: 0
COUGH: 0
NAUSEA: 0
FACIAL SWELLING: 0
BLOOD IN STOOL: 0
SHORTNESS OF BREATH: 0
WHEEZING: 0
TROUBLE SWALLOWING: 0

## 2020-07-06 NOTE — PATIENT INSTRUCTIONS
Orders Placed This Encounter   Procedures    EKG 12 lead     Order Specific Question:   Reason for Exam?     Answer:   Hypertension     Return in about 6 months (around 1/6/2021). Call with any questionsor concerns  Follow up with Kailash Lopez. Saad White, DO, DO for non cardiac problems  Report any new problems  Cardiovascular Fitness-Exercise as tolerated. Strive for 15 minutes of exercise most days of the week. Cardiac / HealthyDiet  Continue current medications as directed  Continue plan of treatment  It is always recommended that you bring your medicationsbottles with you to each visit - this is for your safety!

## 2020-07-06 NOTE — PROGRESS NOTES
[address] [place] [address] Cardiology  [address]  [address]  Phone: [phone]  Fax: [phone]    OFFICE VISIT:  2020    Lena Nima - : 1960    Reason For Visit:  Rachael Cagle is a 61 y.o. male who is here for 6 Month Follow-Up (no cardiac symptoms) and Atrial Fibrillation      HPI    Mr. Lupe Gracia is a 61 y.o. male with history of paroxysmal atrial fibrillation, hypertension, and mild coronary artery disease who presents for 6 month follow-up. Patient states he has been feeling well without symptoms. He is lost 35 pounds since April by doing low carbs. Rachael Cagle has no exertional chest pain, pressure, burning, tightness or squeezing. No lightheadedness, dizziness, or syncope. No numbness or weakness to suggest cerebrovascular accident or transient ischemic attack. he denies signs of bleeding. Reports no edema or shortness of breath. He denies orthopnea or paroxysmal nocturnal dyspnea. he has been compliant with his medications. his BP has been controlled at home running 954V systolic. Dr. Jocy Walter is patient's cardiologist.       Dilshad Sarabia DO is PCP.   Lena Nima has the following history as recorded in [facility]:    Patient Active Problem List    Diagnosis Date Noted    Hypertension      Priority: High    Dyspnea 2018     Priority: High    Sinoatrial node dysfunction (HCC) 2019    PAF (paroxysmal atrial fibrillation) (HCC) 2019    SOB (shortness of breath) 2013     Past Medical History:   Diagnosis Date    Arthritis     CPAP (continuous positive airway pressure) dependence     Diabetes mellitus ([address]     GERD (gastroesophageal reflux disease)     Gout     Hyperlipidemia     Hypertension     Sinoatrial node dysfunction ([address] 5/3/2019    2019  Converted AFL to NSR on amiodarone and dilt, placed on betapace 80 bid    Sleep apnea     SOB (shortness of breath) 2013     Past Surgical History:   Procedure Laterality Date    BACK SURGERY      CHOLECYSTECTOMY      DIAGNOSTIC CARDIAC CATH LAB PROCEDURE  09/2018    DIAGNOSTIC CARDIAC CATH LAB PROCEDURE  05/2019    HEEL SPUR SURGERY  08/08/2019    JOINT REPLACEMENT Bilateral     knees 2016     Family History   Problem Relation Age of Onset    Cancer Mother     Coronary Art Dis Father     Heart Attack Father     Heart Disease Father     High Blood Pressure Father     High Cholesterol Father     No Known Problems Sister     No Known Problems Brother     No Known Problems Brother      Social History     Tobacco Use    Smoking status: Never Smoker    Smokeless tobacco: Never Used   Substance Use Topics    Alcohol use: No      Current Outpatient Medications   Medication Sig Dispense Refill    albuterol sulfate HFA (VENTOLIN HFA) 108 (90 Base) MCG/ACT inhaler Inhale 2 puffs into the lungs every 4 hours as needed for Wheezing      sotalol (BETAPACE) 80 MG tablet TAKE 1 TABLET BY MOUTH TWICE DAILY 180 tablet 3    allopurinol (ZYLOPRIM) 300 MG tablet Take 300 mg by mouth daily      losartan (COZAAR) 50 MG tablet Take 50 mg by mouth 2 times daily      montelukast (SINGULAIR) 10 MG tablet Take 10 mg by mouth nightly      fenofibrate micronized (LOFIBRA) 200 MG capsule Take 200 mg by mouth every morning (before breakfast)      NIFEdipine (PROCARDIA XL) 90 MG extended release tablet Take 90 mg by mouth daily      hydrochlorothiazide (HYDRODIURIL) 25 MG tablet Take 25 mg by mouth daily      Misc Natural Products (OSTEO BI-FLEX ADV DOUBLE ST) TABS Take 1 tablet by mouth daily      Multiple Vitamins-Minerals (THERAPEUTIC MULTIVITAMIN-MINERALS) tablet Take 1 tablet by mouth daily      aspirin 81 MG EC tablet Take 81 mg by mouth daily      omeprazole (PRILOSEC) 40 MG delayed release capsule Take 40 mg by mouth daily      metFORMIN (GLUCOPHAGE) 500 MG tablet HOLD METFORMIN FOR 48 HOURS AFTER THE CARDIAC CATHETERIZATION (Patient taking differently: 500 mg 2 times daily ) 60 tablet 3    albuterol sulfate  (90 Base) MCG/ACT inhaler Inhale 2 puffs into the lungs every 6 hours as needed for Wheezing       No current facility-administered medications for this visit. Allergies: Levaquin [levofloxacin] and Penicillins    Review of Systems  Review of Systems   Constitutional: Negative for activity change, diaphoresis, fatigue, fever and unexpected weight change. HENT: Negative for facial swelling, nosebleeds and trouble swallowing. Eyes: Negative for discharge, redness and visual disturbance. Respiratory: Negative for cough, shortness of breath and wheezing. Cardiovascular: Negative for chest pain, palpitations and leg swelling. Gastrointestinal: Negative for abdominal distention, abdominal pain, blood in stool, nausea and vomiting. Endocrine: Negative for cold intolerance and heat intolerance. Genitourinary: Negative for dysuria and hematuria. Musculoskeletal: Negative for gait problem. Skin: Negative for color change, pallor and rash. Neurological: Negative for dizziness, syncope, facial asymmetry and light-headedness. Hematological: Does not bruise/bleed easily. Psychiatric/Behavioral: Negative for behavioral problems and confusion. All other systems reviewed and are negative. Objective  Vital Signs - /88   Pulse 59   Ht 5' 9\" (1.753 m)   Wt (!) 322 lb (146.1 kg)   SpO2 99%   BMI 47.55 kg/m²   Physical Exam  Vitals signs and nursing note reviewed. Constitutional:       Appearance: He is well-developed. He is obese. HENT:      Head: Normocephalic and atraumatic. Right Ear: External ear normal.      Left Ear: External ear normal.      Nose: Nose normal.   Eyes:      General:         Right eye: No discharge. Left eye: No discharge. Pupils: Pupils are equal, round, and reactive to light. Neck:      Musculoskeletal: Normal range of motion. No edema. Vascular: No carotid bruit or JVD.       Trachea: No tracheal deviation. Cardiovascular:      Rate and Rhythm: Regular rhythm. Bradycardia present. Heart sounds: Normal heart sounds. No murmur. No friction rub. No gallop. Pulmonary:      Effort: Pulmonary effort is normal. No respiratory distress. Breath sounds: No wheezing, rhonchi or rales. Abdominal:      General: Bowel sounds are normal. There is no distension. Palpations: Abdomen is soft. Tenderness: There is no abdominal tenderness. Musculoskeletal: Normal range of motion. Skin:     General: Skin is warm and dry. Capillary Refill: Capillary refill takes less than 2 seconds. Findings: No rash. Neurological:      Mental Status: He is alert and oriented to person, place, and time. Psychiatric:         Behavior: Behavior normal.         Judgment: Judgment normal.         Data:    ECG 11/25/2019  sinus bradycardia, 59 bpm    QTc 0.425 ms    TTE 5/1/2019  Normal left ventricular size and function. Moderate concentric left ventricular hypertrophy. Left ventricular ejection fraction is estimated at 59%    Cath 5/3/2019  1. Successful femoral artery ultrasound  2. Successful femoral artery arteriogram  3. Supervision of the administration of moderate conscious sedation  4. Mild coronary artery disease  5. Left ventricular function is normal    Zio Report 11/25/2019 (13 days and 21 hours)  1. underlying rhythm was sinus rhythm, minimum heart rate 49 bpm, max heart rate 187 bpm  2.  9 runs of SVT. The fastest interval lasted 5 beats at a maximal heart rate of 187 bpm.  The longest was 13 beats with an average heart rate of 117 bpm.  3.  11 beat run of accelerated junctional rhythm. Assessment, Recommendations, & Plan:  61 y.o. male with    Diagnosis Orders   1. Essential hypertension  EKG 12 lead     1. Paroxysmal atrial fibrillation-patient is in sinus rhythm today. Continue on sotalol and Eliquis. 2.  Hypertension-stable on current regimen.   Blood pressure today 138/88. Patient states this has been controlled at home. No changes made to current regimen. 3.  Mild coronary artery disease- stable, no exertional chest pain, pressure, or tightness. Continue risk modifications. 4. Obesity- recommended discussing with your PCP an individualized, heart healthy diet and exercise program tailored for you. Please do not hesitate to contact me for any questions or concerns.     Sincerely yours,    ANTHONY Lorenzo

## 2020-07-06 NOTE — TELEPHONE ENCOUNTER
Date of Surgery: 7-29-20    Cardiologist: Dr. Rosalie Dhaliwal    Procedure: Tooth extraction    Surgeon: Dr. Tony Gold    Last Office Visit: 7-6-20  Reason for office visit and medical concerns addressed at this office visit: PAF, HTN, CAD, DM    Testing Performed and Date of Service:  EKG 7-6-20  Heart cath 4-30-19    Does the patient have a stent? If so, what type? Not within last year      Current Medications: Eliquis, Betapace, zyloprim, losartan, singulair, lofibra, procardia, hydrodiuril, ASA, prilosec, metformin    Is the patient currently taking an anticoagulant? If so, what is the diagnosis the patient has been given to warrant the need for the anticoagulant?  Eliquis, ASA    Additional Notes: Med hold for Eliquis and ASA

## 2021-01-22 ENCOUNTER — OFFICE VISIT (OUTPATIENT)
Dept: CARDIOLOGY CLINIC | Age: 61
End: 2021-01-22
Payer: COMMERCIAL

## 2021-01-22 VITALS
OXYGEN SATURATION: 98 % | DIASTOLIC BLOOD PRESSURE: 84 MMHG | BODY MASS INDEX: 46.65 KG/M2 | SYSTOLIC BLOOD PRESSURE: 138 MMHG | HEART RATE: 63 BPM | HEIGHT: 69 IN | WEIGHT: 315 LBS

## 2021-01-22 DIAGNOSIS — I10 ESSENTIAL HYPERTENSION: ICD-10-CM

## 2021-01-22 DIAGNOSIS — I48.0 PAF (PAROXYSMAL ATRIAL FIBRILLATION) (HCC): Primary | ICD-10-CM

## 2021-01-22 DIAGNOSIS — E66.01 CLASS 3 SEVERE OBESITY WITH BODY MASS INDEX (BMI) OF 45.0 TO 49.9 IN ADULT, UNSPECIFIED OBESITY TYPE, UNSPECIFIED WHETHER SERIOUS COMORBIDITY PRESENT (HCC): ICD-10-CM

## 2021-01-22 DIAGNOSIS — I25.10 MILD CORONARY ARTERY DISEASE: ICD-10-CM

## 2021-01-22 PROCEDURE — 99213 OFFICE O/P EST LOW 20 MIN: CPT | Performed by: NURSE PRACTITIONER

## 2021-01-22 PROCEDURE — 93000 ELECTROCARDIOGRAM COMPLETE: CPT | Performed by: NURSE PRACTITIONER

## 2021-01-22 ASSESSMENT — ENCOUNTER SYMPTOMS
FACIAL SWELLING: 0
COUGH: 0
EYE REDNESS: 0
COLOR CHANGE: 0
NAUSEA: 0
WHEEZING: 0
BLOOD IN STOOL: 0
EYE DISCHARGE: 0
VOMITING: 0
ABDOMINAL PAIN: 0
ABDOMINAL DISTENTION: 0
SHORTNESS OF BREATH: 0
TROUBLE SWALLOWING: 0

## 2021-01-22 NOTE — PROGRESS NOTES
[address] [place] [address] Cardiology  [address]  [address]  Phone: [phone]  Fax: [phone]    OFFICE VISIT:  2021    Jeronimo Farah - : 1960    Reason For Visit:  Kyler Lopez is a 61 y.o. male who is here for 6 Month Follow-Up (Patient denies any cardiac symptoms. ) and Atrial Fibrillation      HPI    Mr. Reece Cochran is a 61 y.o. male with history of paroxysmal atrial fibrillation, hypertension, and mild coronary artery disease who presents for 6 month follow-up. Patient states he has been doing well since previous appointment. He has started gaining weight back however. He is thinking about bariatric weight loss surgery. He states he has had a couple of episodes of atrial fibrillation without any associated symptoms. He has had no problems on his blood thinner. Kyler Lopez has no exertional chest pain, pressure, burning, tightness or squeezing. No lightheadedness, dizziness, or syncope. No numbness or weakness to suggest cerebrovascular accident or transient ischemic attack. he denies signs of bleeding. Reports no edema or shortness of breath. He denies orthopnea or paroxysmal nocturnal dyspnea. he has been compliant with his medications. his BP has been controlled at home running 128J systolic. Dr. Yared Rutledge is patient's cardiologist.       Divina Ndiaye. Chivo Chung DO is PCP.   Jeronimo Farah has the following history as recorded in [facility]:    Patient Active Problem List    Diagnosis Date Noted    Hypertension      Priority: High    Dyspnea 2018     Priority: High    Mild coronary artery disease 2020    Class 3 severe obesity with body mass index (BMI) of 45.0 to 49.9 in adult [facility]) 2020    Sinoatrial node dysfunction (HCC) 2019    PAF (paroxysmal atrial fibrillation) (HCC) 2019    SOB (shortness of breath) 2013     Past Medical History:   Diagnosis Date    Arthritis     CPAP (continuous positive airway pressure) dependence  Diabetes mellitus [facility]     GERD (gastroesophageal reflux disease)     Gout     Hyperlipidemia     Hypertension     Mild coronary artery disease 7/6/2020    Sinoatrial node dysfunction ([facility] 5/3/2019    5/2/2019  Converted AFL to NSR on amiodarone and dilt, placed on betapace 80 bid    Sleep apnea     SOB (shortness of breath) 7/17/2013     Past Surgical History:   Procedure Laterality Date    BACK SURGERY      CHOLECYSTECTOMY      DIAGNOSTIC CARDIAC CATH LAB PROCEDURE  09/2018    DIAGNOSTIC CARDIAC CATH LAB PROCEDURE  05/2019    HEEL SPUR SURGERY  08/08/2019    JOINT REPLACEMENT Bilateral     knees 2016     Family History   Problem Relation Age of Onset    Cancer Mother     Coronary Art Dis Father     Heart Attack Father     Heart Disease Father     High Blood Pressure Father     High Cholesterol Father     No Known Problems Sister     No Known Problems Brother     No Known Problems Brother      Social History     Tobacco Use    Smoking status: Never Smoker    Smokeless tobacco: Never Used   Substance Use Topics    Alcohol use: No      Current Outpatient Medications   Medication Sig Dispense Refill    albuterol sulfate HFA (VENTOLIN HFA) 108 (90 Base) MCG/ACT inhaler Inhale 2 puffs into the lungs every 4 hours as needed for Wheezing      ELIQUIS 5 MG TABS tablet Take 5 mg by mouth 2 times daily      sotalol (BETAPACE) 80 MG tablet TAKE 1 TABLET BY MOUTH TWICE DAILY 180 tablet 3    allopurinol (ZYLOPRIM) 300 MG tablet Take 300 mg by mouth daily      losartan (COZAAR) 50 MG tablet Take 50 mg by mouth 2 times daily      montelukast (SINGULAIR) 10 MG tablet Take 10 mg by mouth nightly      fenofibrate micronized (LOFIBRA) 200 MG capsule Take 200 mg by mouth every morning (before breakfast)      NIFEdipine (PROCARDIA XL) 90 MG extended release tablet Take 90 mg by mouth daily      hydrochlorothiazide (HYDRODIURIL) 25 MG tablet Take 25 mg by mouth daily  Misc Natural Products (OSTEO BI-FLEX ADV DOUBLE ST) TABS Take 1 tablet by mouth daily      Multiple Vitamins-Minerals (THERAPEUTIC MULTIVITAMIN-MINERALS) tablet Take 1 tablet by mouth daily      aspirin 81 MG EC tablet Take 81 mg by mouth daily      omeprazole (PRILOSEC) 40 MG delayed release capsule Take 40 mg by mouth daily      metFORMIN (GLUCOPHAGE) 500 MG tablet HOLD METFORMIN FOR 48 HOURS AFTER THE CARDIAC CATHETERIZATION (Patient taking differently: 500 mg 2 times daily ) 60 tablet 3     No current facility-administered medications for this visit. Allergies: Levaquin [levofloxacin] and Penicillins    Review of Systems  Review of Systems   Constitutional: Negative for activity change, diaphoresis, fatigue, fever and unexpected weight change. HENT: Negative for facial swelling, nosebleeds and trouble swallowing. Eyes: Negative for discharge, redness and visual disturbance. Respiratory: Negative for cough, shortness of breath and wheezing. Cardiovascular: Negative for chest pain, palpitations and leg swelling. Gastrointestinal: Negative for abdominal distention, abdominal pain, blood in stool, nausea and vomiting. Endocrine: Negative for cold intolerance and heat intolerance. Genitourinary: Negative for dysuria and hematuria. Musculoskeletal: Negative for gait problem. Skin: Negative for color change, pallor and rash. Neurological: Negative for dizziness, syncope, facial asymmetry and light-headedness. Hematological: Does not bruise/bleed easily. Psychiatric/Behavioral: Negative for behavioral problems and confusion. All other systems reviewed and are negative. Objective  Vital Signs - /84   Pulse 63   Ht 5' 9\" (1.753 m)   Wt (!) 343 lb (155.6 kg)   SpO2 98%   BMI 50.65 kg/m²   Physical Exam  Vitals signs and nursing note reviewed. Constitutional:       Appearance: He is well-developed. He is obese. HENT:      Head: Normocephalic and atraumatic. Right Ear: External ear normal.      Left Ear: External ear normal.      Nose: Nose normal.   Eyes:      General:         Right eye: No discharge. Left eye: No discharge. Pupils: Pupils are equal, round, and reactive to light. Neck:      Musculoskeletal: Normal range of motion. No edema. Vascular: No carotid bruit or JVD. Trachea: No tracheal deviation. Cardiovascular:      Rate and Rhythm: Normal rate and regular rhythm. Heart sounds: Normal heart sounds. No murmur. No friction rub. No gallop. Pulmonary:      Effort: Pulmonary effort is normal. No respiratory distress. Breath sounds: No wheezing, rhonchi or rales. Abdominal:      General: Bowel sounds are normal. There is no distension. Palpations: Abdomen is soft. Tenderness: There is no abdominal tenderness. Musculoskeletal: Normal range of motion. Skin:     General: Skin is warm and dry. Capillary Refill: Capillary refill takes less than 2 seconds. Findings: No rash. Neurological:      Mental Status: He is alert and oriented to person, place, and time. Psychiatric:         Behavior: Behavior normal.         Judgment: Judgment normal.         Data:    ECG 11/25/2019  sinus rhythm, 63 bpm    QTc 0.411 ms    TTE 5/1/2019  Normal left ventricular size and function. Moderate concentric left ventricular hypertrophy. Left ventricular ejection fraction is estimated at 59%    Cath 5/3/2019  1. Successful femoral artery ultrasound  2. Successful femoral artery arteriogram  3. Supervision of the administration of moderate conscious sedation  4. Mild coronary artery disease  5.   Left ventricular function is normal    Zio Report 11/25/2019 (13 days and 21 hours)  1. underlying rhythm was sinus rhythm, minimum heart rate 49 bpm, max heart rate 187 bpm 2.  9 runs of SVT. The fastest interval lasted 5 beats at a maximal heart rate of 187 bpm.  The longest was 13 beats with an average heart rate of 117 bpm.  3.  11 beat run of accelerated junctional rhythm. Assessment, Recommendations, & Plan:  61 y.o. male with    Diagnosis Orders   1. PAF (paroxysmal atrial fibrillation) ([facility])  EKG 12 lead   2. Essential hypertension     3. Mild coronary artery disease     4. Class 3 severe obesity with body mass index (BMI) of 45.0 to 49.9 in adult, unspecified obesity type, unspecified whether serious comorbidity present ([facility]       1. Paroxysmal atrial fibrillation-patient is in sinus rhythm today. Continue on sotalol and Eliquis. 2.  Hypertension-stable on current regimen. Blood pressure today 138/84. Patient states this has been controlled at home. No changes made to current regimen. 3.  Mild coronary artery disease- stable, no exertional chest pain, pressure, or tightness. Continue risk modifications. 4. Obesity- recommended discussing with your PCP an individualized, heart healthy diet and exercise program tailored for you. Please do not hesitate to contact me for any questions or concerns.     Sincerely yours,    ANTHONY Rowley

## 2021-01-22 NOTE — PATIENT INSTRUCTIONS
Orders Placed This Encounter   Procedures    EKG 12 lead     Order Specific Question:   Reason for Exam?     Answer: Other     Return in about 6 months (around 7/22/2021). Call with any questionsor concerns  Follow up with Sanchez Singleton. Tc Torres, DO, DO for non cardiac problems  Report any new problems  Cardiovascular Fitness-Exercise as tolerated. Strive for 15 minutes of exercise most days of the week. Cardiac / HealthyDiet  Continue current medications as directed  Continue plan of treatment  It is always recommended that you bring your medicationsbottles with you to each visit - this is for your safety!

## 2021-03-16 RX ORDER — APIXABAN 5 MG/1
5 TABLET, FILM COATED ORAL 2 TIMES DAILY
Qty: 60 TABLET | Refills: 5 | Status: SHIPPED | OUTPATIENT
Start: 2021-03-16 | End: 2021-09-21

## 2021-09-20 ENCOUNTER — OFFICE VISIT (OUTPATIENT)
Dept: CARDIOLOGY CLINIC | Age: 61
End: 2021-09-20
Payer: COMMERCIAL

## 2021-09-20 VITALS
WEIGHT: 315 LBS | HEART RATE: 67 BPM | DIASTOLIC BLOOD PRESSURE: 78 MMHG | BODY MASS INDEX: 46.65 KG/M2 | SYSTOLIC BLOOD PRESSURE: 130 MMHG | HEIGHT: 69 IN | OXYGEN SATURATION: 98 %

## 2021-09-20 DIAGNOSIS — I25.10 CORONARY ARTERY DISEASE INVOLVING NATIVE CORONARY ARTERY OF NATIVE HEART WITHOUT ANGINA PECTORIS: ICD-10-CM

## 2021-09-20 DIAGNOSIS — I10 ESSENTIAL HYPERTENSION: ICD-10-CM

## 2021-09-20 DIAGNOSIS — I48.0 PAF (PAROXYSMAL ATRIAL FIBRILLATION) (HCC): Primary | ICD-10-CM

## 2021-09-20 PROCEDURE — 93000 ELECTROCARDIOGRAM COMPLETE: CPT | Performed by: NURSE PRACTITIONER

## 2021-09-20 PROCEDURE — 99214 OFFICE O/P EST MOD 30 MIN: CPT | Performed by: NURSE PRACTITIONER

## 2021-09-20 RX ORDER — MULTIVIT WITH MINERALS/LUTEIN
250 TABLET ORAL DAILY
Status: ON HOLD | COMMUNITY
End: 2022-09-20 | Stop reason: ALTCHOICE

## 2021-09-20 ASSESSMENT — ENCOUNTER SYMPTOMS
SHORTNESS OF BREATH: 0
WHEEZING: 0
COUGH: 0
SORE THROAT: 0
CHEST TIGHTNESS: 0

## 2021-09-20 NOTE — PROGRESS NOTES
[facility] Cardiology   Established Patient Office Visit   [facility]. [address], [place]  [phone]        OFFICE VISIT:  2021    Amanda Dubose - : 1960    Reason For Visit:  Candi Haddad is a 61 y.o. male who is here for 6 Month Follow-Up (No cardiac symptoms )    1. PAF (paroxysmal atrial fibrillation) ([address]    2. Coronary artery disease involving native coronary artery of native heart without angina pectoris    3. Essential hypertension      Patient with a history of paroxysmal atrial fib, hypertension, mild coronary artery disease. He is a patient of Dr. Duran Taylor. Patient presents to clinic today for 6-month follow-up. Patient denies any chest pain, pressure or tightness. There is no shortness of breath, orthopnea or PND. Patient denies any lightheadedness, dizziness or syncope.       Subjective    Amanda Dubose is a 61 y.o. male with the [facility]:    Patient Active Problem List    Diagnosis Date Noted    Hypertension     Dyspnea 2018    Mild coronary artery disease 2020    Class 3 severe obesity with body mass index (BMI) of 45.0 to 49.9 in adult [facility]) 2020    Sinoatrial node dysfunction (HCC) 2019    PAF (paroxysmal atrial fibrillation) (HCC) 2019    SOB (shortness of breath) 2013     Current Outpatient Medications   Medication Sig Dispense Refill    Ascorbic Acid (VITAMIN C) 250 MG tablet Take 250 mg by mouth daily      ELIQUIS 5 MG TABS tablet Take 1 tablet by mouth 2 times daily 60 tablet 5    albuterol sulfate HFA (VENTOLIN HFA) 108 (90 Base) MCG/ACT inhaler Inhale 2 puffs into the lungs every 4 hours as needed for Wheezing      sotalol (BETAPACE) 80 MG tablet TAKE 1 TABLET BY MOUTH TWICE DAILY 180 tablet 3    allopurinol (ZYLOPRIM) 300 MG tablet Take 300 mg by mouth daily      losartan (COZAAR) 50 MG tablet Take 50 mg by mouth 2 times daily      montelukast (SINGULAIR) 10 MG tablet Take 10 mg by mouth nightly      fenofibrate micronized (LOFIBRA) 200 MG capsule Take 200 mg by mouth every morning (before breakfast)      NIFEdipine (PROCARDIA XL) 90 MG extended release tablet Take 90 mg by mouth daily      hydrochlorothiazide (HYDRODIURIL) 25 MG tablet Take 25 mg by mouth daily      Misc Natural Products (OSTEO BI-FLEX ADV DOUBLE ST) TABS Take 1 tablet by mouth daily      Multiple Vitamins-Minerals (THERAPEUTIC MULTIVITAMIN-MINERALS) tablet Take 1 tablet by mouth daily      aspirin 81 MG EC tablet Take 81 mg by mouth daily      omeprazole (PRILOSEC) 40 MG delayed release capsule Take 40 mg by mouth daily      metFORMIN (GLUCOPHAGE) 500 MG tablet HOLD METFORMIN FOR 48 HOURS AFTER THE CARDIAC CATHETERIZATION (Patient taking differently: 500 mg 2 times daily ) 60 tablet 3     No current facility-administered medications for this visit.      Allergies: Levaquin [levofloxacin] and Penicillins  Past Medical History:   Diagnosis Date    Arthritis     CPAP (continuous positive airway pressure) dependence     Diabetes mellitus (HCC)     GERD (gastroesophageal reflux disease)     Gout     Hyperlipidemia     Hypertension     Mild coronary artery disease 7/6/2020    Sinoatrial node dysfunction ([address] 5/3/2019    5/2/2019  Converted AFL to NSR on amiodarone and dilt, placed on betapace 80 bid    Sleep apnea     SOB (shortness of breath) 7/17/2013     Past Surgical History:   Procedure Laterality Date    BACK SURGERY      CHOLECYSTECTOMY      DIAGNOSTIC CARDIAC CATH LAB PROCEDURE  09/2018    DIAGNOSTIC CARDIAC CATH LAB PROCEDURE  05/2019    HEEL SPUR SURGERY  08/08/2019    JOINT REPLACEMENT Bilateral     knees 2016     Family History   Problem Relation Age of Onset    Cancer Mother     Coronary Art Dis Father     Heart Attack Father     Heart Disease Father     High Blood Pressure Father     High Cholesterol Father     No Known Problems Sister     No Known Problems Brother     No Known Problems Brother      Social History     Tobacco Use    Smoking status: Never Smoker    Smokeless tobacco: Never Used   Substance Use Topics    Alcohol use: No          Review of Systems:    Review of Systems   Constitutional: Negative for activity change, chills, diaphoresis, fatigue and fever. HENT: Negative for congestion and sore throat. Respiratory: Negative for cough, chest tightness, shortness of breath and wheezing. Cardiovascular: Negative for chest pain, palpitations and leg swelling. Neurological: Negative for dizziness, syncope, light-headedness and headaches. Psychiatric/Behavioral: Negative for confusion. The patient is not nervous/anxious. Objective:    /78   Pulse 67   Ht 5' 9\" (1.753 m)   Wt (!) 342 lb (155.1 kg)   SpO2 98%   BMI 50.50 kg/m²     GENERAL - well developed and well nourished, conversant  HEENT -  PERRLA, Hearing appears normal, gentleman lids are normal.  External inspection of ears and nose appear normal.  NECK - no thyromegaly, no JVD, trachea is in the midline  CARDIOVASCULAR - PMI is in the mid line clavicular position, Normal S1 and S2 with no systolic murmur. No S3 or S4    PULMONARY - no respiratory distress. No wheezes or rales. Lungs are clear to ausculation, normal respiratory effort. ABDOMEN  - soft, non tender, no rebound  MUSCULOSKELETAL  - range of motion of the upper and lower extermites appears normal and equal and is without pain   EXTREMITIES - no significant edema   NEUROLOGIC - gait and station are normal  SKIN - turgor is normal, can warm and dry.   PSYCHIATRIC - normal mood and affect, alert and orientated x 3,      ASSESSMENT:    ALL THE CARDIOLOGY PROBLEMS ARE LISTED ABOVE; HOWEVER, THE FOLLOWING SPECIFIC CARDIAC PROBLEMS / CONDITIONS WERE ADDRESSED AND TREATED DURING THE OFFICE VISIT TODAY:                                                                                            MEDICAL DECISION MAKING Cardiac Specific Problem / Diagnosis  Discussion and Data Reviewed Diagnostic Procedures Ordered Management Options Selected           1. PAF  Stable   Review and summation of old records:    EKG in the office today showing sinus rhythm with a heart rate of 61 bpm.  Patient is on Eliquis 5 mg twice daily for stroke prevention. Betapace 80 mg twice daily. QTc in the office 423. Yes Continue current medications:     Yes           2. CAD- mild  Stable   No chest pain, no acute changes on EKG. Patient is on aspirin. Yes Continue current medications:    Yes           3. Hypertension Stable  blood pressure in the office today 130/78. O2 sat 98%. Patient is on hydrochlorothiazide 25 mg daily. Losartan 50 mg twice daily. Procardia XL 90 mg daily. No Continue current medications: Yes                     Orders Placed This Encounter   Procedures    EKG 12 lead     Order Specific Question:   Reason for Exam?     Answer:   Irregular heart rate     Comments:   a fib     No orders of the defined types were placed in this encounter. Discussed with patient. Return in about 6 months (around 3/20/2022) for Follow up with me. I greatly appreciate the opportunity to meet Charlie Rob and your confidence in allowing me to participate in his cardiovascular care. ANTHONY Martinez NP  9/20/2021 9:42 AM CDT                    This dictation was generated by voice recognition computer software. Although all attempts are made to edit dictation for accuracy, there may be errors in the transcription that are not intended.

## 2021-09-21 RX ORDER — APIXABAN 5 MG/1
TABLET, FILM COATED ORAL
Qty: 60 TABLET | Refills: 5 | Status: SHIPPED | OUTPATIENT
Start: 2021-09-21 | End: 2022-03-22

## 2022-03-21 ENCOUNTER — OFFICE VISIT (OUTPATIENT)
Dept: CARDIOLOGY CLINIC | Age: 62
End: 2022-03-21
Payer: COMMERCIAL

## 2022-03-21 VITALS
SYSTOLIC BLOOD PRESSURE: 130 MMHG | HEIGHT: 69 IN | DIASTOLIC BLOOD PRESSURE: 78 MMHG | OXYGEN SATURATION: 98 % | BODY MASS INDEX: 46.65 KG/M2 | WEIGHT: 315 LBS | HEART RATE: 71 BPM

## 2022-03-21 DIAGNOSIS — I48.0 PAF (PAROXYSMAL ATRIAL FIBRILLATION) (HCC): Primary | ICD-10-CM

## 2022-03-21 DIAGNOSIS — I25.10 CORONARY ARTERY DISEASE INVOLVING NATIVE CORONARY ARTERY OF NATIVE HEART WITHOUT ANGINA PECTORIS: ICD-10-CM

## 2022-03-21 DIAGNOSIS — I10 ESSENTIAL HYPERTENSION: ICD-10-CM

## 2022-03-21 PROCEDURE — 93000 ELECTROCARDIOGRAM COMPLETE: CPT | Performed by: NURSE PRACTITIONER

## 2022-03-21 PROCEDURE — 99214 OFFICE O/P EST MOD 30 MIN: CPT | Performed by: NURSE PRACTITIONER

## 2022-03-21 ASSESSMENT — ENCOUNTER SYMPTOMS
COUGH: 0
SORE THROAT: 0
WHEEZING: 0
SHORTNESS OF BREATH: 0
CHEST TIGHTNESS: 0

## 2022-03-21 NOTE — PROGRESS NOTES
[address] [facility] Cardiology   Established Patient Office Visit  [address] [facility]. [address], [place]  [phone]        OFFICE VISIT:  3/21/2022    Manny Gonsales - : 1960    Reason For Visit:  Татьяна Sagastume is a 64 y.o. male who is here for 6 Month Follow-Up    1. PAF (paroxysmal atrial fibrillation) ([address]    2. Coronary artery disease involving native coronary artery of native heart without angina pectoris    3. Essential hypertension      Patient with a history of paroxysmal atrial fib, hypertension, mild coronary artery disease.     He is a patient of Dr. Rochelle Calderon.     Patient presents to clinic today for 6-month follow-up. Patient denies any chest pain, pressure or tightness. There is no shortness of breath, orthopnea or PND. Patient denies any lightheadedness, dizziness or syncope. Has upcoming appointment with bariatric surgeon to discuss possible procedure.       Subjective    Manny Gonsales is a 64 y.o. male with the following history as recorded in [facility]:    Patient Active Problem List    Diagnosis Date Noted    Hypertension     Dyspnea 2018    Mild coronary artery disease 2020    Class 3 severe obesity with body mass index (BMI) of 45.0 to 49.9 in adult [facility]) 2020    Sinoatrial node dysfunction (HCC) 2019    PAF (paroxysmal atrial fibrillation) (HCC) 2019    SOB (shortness of breath) 2013     Current Outpatient Medications   Medication Sig Dispense Refill    ELIQUIS 5 MG TABS tablet Take 1 tablet by mouth twice daily 60 tablet 5    Ascorbic Acid (VITAMIN C) 250 MG tablet Take 250 mg by mouth daily      albuterol sulfate HFA (VENTOLIN HFA) 108 (90 Base) MCG/ACT inhaler Inhale 2 puffs into the lungs every 4 hours as needed for Wheezing      sotalol (BETAPACE) 80 MG tablet TAKE 1 TABLET BY MOUTH TWICE DAILY 180 tablet 3    allopurinol (ZYLOPRIM) 300 MG tablet Take 300 mg by mouth daily      losartan (COZAAR) 50 MG tablet Take 50 mg by mouth 2 times daily      montelukast (SINGULAIR) 10 MG tablet Take 10 mg by mouth nightly      fenofibrate micronized (LOFIBRA) 200 MG capsule Take 200 mg by mouth every morning (before breakfast)      NIFEdipine (PROCARDIA XL) 90 MG extended release tablet Take 90 mg by mouth daily      hydrochlorothiazide (HYDRODIURIL) 25 MG tablet Take 25 mg by mouth daily      Misc Natural Products (OSTEO BI-FLEX ADV DOUBLE ST) TABS Take 1 tablet by mouth daily      Multiple Vitamins-Minerals (THERAPEUTIC MULTIVITAMIN-MINERALS) tablet Take 1 tablet by mouth daily      aspirin 81 MG EC tablet Take 81 mg by mouth daily      omeprazole (PRILOSEC) 40 MG delayed release capsule Take 40 mg by mouth daily      metFORMIN (GLUCOPHAGE) 500 MG tablet HOLD METFORMIN FOR 48 HOURS AFTER THE CARDIAC CATHETERIZATION (Patient taking differently: 500 mg 2 times daily ) 60 tablet 3     No current facility-administered medications for this visit.      Allergies: Levaquin [levofloxacin] and Penicillins  Past Medical History:   Diagnosis Date    Arthritis     CPAP (continuous positive airway pressure) dependence     Diabetes mellitus (HCC)     GERD (gastroesophageal reflux disease)     Gout     Hyperlipidemia     Hypertension     Mild coronary artery disease 7/6/2020    Sinoatrial node dysfunction ([address] 5/3/2019    5/2/2019  Converted AFL to NSR on amiodarone and dilt, placed on betapace 80 bid    Sleep apnea     SOB (shortness of breath) 7/17/2013     Past Surgical History:   Procedure Laterality Date    BACK SURGERY      CHOLECYSTECTOMY      DIAGNOSTIC CARDIAC CATH LAB PROCEDURE  09/2018    DIAGNOSTIC CARDIAC CATH LAB PROCEDURE  05/2019    HEEL SPUR SURGERY  08/08/2019    JOINT REPLACEMENT Bilateral     knees 2016     Family History   Problem Relation Age of Onset    Cancer Mother     Coronary Art Dis Father     Heart Attack Father     Heart Disease Father     High Blood Pressure Father     High Cholesterol Father     No Known Problems Sister     No Known Problems Brother     No Known Problems Brother      Social History     Tobacco Use    Smoking status: Never Smoker    Smokeless tobacco: Never Used   Substance Use Topics    Alcohol use: No          Review of Systems:    Review of Systems   Constitutional: Negative for activity change, chills, diaphoresis, fatigue and fever. HENT: Negative for congestion and sore throat. Respiratory: Negative for cough, chest tightness, shortness of breath and wheezing. Cardiovascular: Negative for chest pain, palpitations and leg swelling. Neurological: Negative for dizziness, syncope, light-headedness and headaches. Psychiatric/Behavioral: Negative for confusion. The patient is not nervous/anxious. Objective:    /78   Pulse 71   Ht 5' 9\" (1.753 m)   Wt (!) 351 lb (159.2 kg)   SpO2 98%   BMI 51.83 kg/m²     GENERAL - well developed and well nourished, conversant  HEENT   PERRLA, Hearing appears normal, gentleman lids are normal.  External inspection of ears and nose appear normal.  NECK - no thyromegaly, no JVD, trachea is in the midline  CARDIOVASCULAR  PMI is in the mid line clavicular position, Normal S1 and S2 withno systolic murmur. No S3 or S4    PULMONARY  no respiratory distress. No wheezes or rales. Lungs are clear to ausculation, normal respiratory effort. ABDOMEN   soft, non tender, no rebound  MUSCULOSKELETAL   range of motion of the upper and lower extermites appears normal and equal and is without pain   EXTREMITIES - no significant edema   NEUROLOGIC  gait and station are normal  SKIN - turgor is normal, can warm and dry.   PSYCHIATRIC - normal mood and affect, alert and orientated x 3,      ASSESSMENT:    ALL THE CARDIOLOGY PROBLEMS ARE LISTED ABOVE; HOWEVER, THE FOLLOWING SPECIFIC CARDIAC PROBLEMS / CONDITIONS WERE ADDRESSED AND TREATED DURING THE OFFICE VISIT TODAY: MEDICAL DECISION MAKING             Cardiac Specific Problem / Diagnosis  Discussion and Data Reviewed Diagnostic Procedures Ordered Management Options Selected           1. Coronary artery disease-mild  Stable   Review and summation of old records:    No chest pain. Patient is on aspirin No Continue current medications:     Yes           2. PAF  Stable   EKG in the office today showing sinus rhythm with a heart rate of 65 bpm.  Patient is on Eliquis 5 mg twice daily for stroke prevention. Betapace 80 mg twice daily. QTc 412. Yes Continue current medications:    Yes           3. Hypertension Well Controlled  blood pressure in the office today 130/78. O2 sat 98%. Patient is on hydrochlorothiazide 25 mg daily. Losartan 50 mg twice daily. No Continue current medications: Yes                     Orders Placed This Encounter   Procedures    EKG 12 lead     Order Specific Question:   Reason for Exam?     Answer:   Irregular heart rate     No orders of the defined types were placed in this encounter. Discussed with patient. No follow-ups on file. I greatly appreciate the opportunity to meet Dieter Dietz and your confidence in allowing me to participate in his cardiovascular care. ANTHONY Sequeira NP  3/21/2022 8:48 AM CDT                    This dictation was generated by voice recognition computer software. Although all attempts are made to edit dictation for accuracy, there may be errors in the transcription that are not intended.

## 2022-03-22 RX ORDER — APIXABAN 5 MG/1
TABLET, FILM COATED ORAL
Qty: 60 TABLET | Refills: 5 | Status: SHIPPED | OUTPATIENT
Start: 2022-03-22 | End: 2022-09-26

## 2022-06-27 RX ORDER — SOTALOL HYDROCHLORIDE 80 MG/1
TABLET ORAL
Qty: 180 TABLET | Refills: 1 | Status: SHIPPED | OUTPATIENT
Start: 2022-06-27

## 2022-08-03 ENCOUNTER — TELEPHONE (OUTPATIENT)
Dept: CARDIOLOGY CLINIC | Age: 62
End: 2022-08-03

## 2022-08-03 NOTE — TELEPHONE ENCOUNTER
Kellee Muñoz, ANTHONY - NP  You 1 minute ago (9:07 AM)     LD    Okay to send letter and hold Eliquis and aspirin prior to colonoscopy.

## 2022-08-23 ENCOUNTER — TELEPHONE (OUTPATIENT)
Dept: CARDIOLOGY CLINIC | Age: 62
End: 2022-08-23

## 2022-09-02 ENCOUNTER — TELEPHONE (OUTPATIENT)
Dept: GASTROENTEROLOGY | Age: 62
End: 2022-09-02

## 2022-09-02 NOTE — TELEPHONE ENCOUNTER
Patient: Duke Canavan    YOB: 1960      Clearance was received on September 2, 2022.     for Colonoscopy scheduled for: 9/20/22    Patient may discontinue the use of ELIQUIS for 5  days prior to the procedure & ASA 5 DAYS    IS Lovenox required:  NO    PATIENT NOTIFIED ON:  9/2/22 -       Clearance scanned into media

## 2022-09-20 ENCOUNTER — ANESTHESIA (OUTPATIENT)
Dept: ENDOSCOPY | Age: 62
End: 2022-09-20
Payer: COMMERCIAL

## 2022-09-20 ENCOUNTER — ANESTHESIA EVENT (OUTPATIENT)
Dept: ENDOSCOPY | Age: 62
End: 2022-09-20
Payer: COMMERCIAL

## 2022-09-20 ENCOUNTER — HOSPITAL ENCOUNTER (OUTPATIENT)
Age: 62
Setting detail: OUTPATIENT SURGERY
Discharge: HOME | End: 2022-09-20
Attending: INTERNAL MEDICINE | Admitting: INTERNAL MEDICINE
Payer: COMMERCIAL

## 2022-09-20 VITALS
RESPIRATION RATE: 20 BRPM | OXYGEN SATURATION: 97 % | SYSTOLIC BLOOD PRESSURE: 113 MMHG | DIASTOLIC BLOOD PRESSURE: 79 MMHG | HEIGHT: 69 IN | HEART RATE: 67 BPM | WEIGHT: 303 LBS | BODY MASS INDEX: 44.88 KG/M2 | TEMPERATURE: 98 F

## 2022-09-20 DIAGNOSIS — Z83.71 FH: COLON POLYPS: ICD-10-CM

## 2022-09-20 DIAGNOSIS — Z80.0 FH: COLON CANCER: ICD-10-CM

## 2022-09-20 DIAGNOSIS — Z12.11 SCREEN FOR COLON CANCER: ICD-10-CM

## 2022-09-20 LAB
GLUCOSE BLD-MCNC: 111 MG/DL (ref 70–99)
PERFORMED ON: ABNORMAL

## 2022-09-20 PROCEDURE — 2500000003 HC RX 250 WO HCPCS: Performed by: NURSE ANESTHETIST, CERTIFIED REGISTERED

## 2022-09-20 PROCEDURE — 2580000003 HC RX 258: Performed by: NURSE ANESTHETIST, CERTIFIED REGISTERED

## 2022-09-20 PROCEDURE — 6360000002 HC RX W HCPCS: Performed by: NURSE ANESTHETIST, CERTIFIED REGISTERED

## 2022-09-20 PROCEDURE — 3700000000 HC ANESTHESIA ATTENDED CARE: Performed by: INTERNAL MEDICINE

## 2022-09-20 PROCEDURE — 2580000003 HC RX 258: Performed by: INTERNAL MEDICINE

## 2022-09-20 PROCEDURE — 88305 TISSUE EXAM BY PATHOLOGIST: CPT

## 2022-09-20 PROCEDURE — 2709999900 HC NON-CHARGEABLE SUPPLY: Performed by: INTERNAL MEDICINE

## 2022-09-20 PROCEDURE — 3609010600 HC COLONOSCOPY POLYPECTOMY SNARE/COLD BIOPSY: Performed by: INTERNAL MEDICINE

## 2022-09-20 PROCEDURE — 82947 ASSAY GLUCOSE BLOOD QUANT: CPT

## 2022-09-20 PROCEDURE — 7100000010 HC PHASE II RECOVERY - FIRST 15 MIN: Performed by: INTERNAL MEDICINE

## 2022-09-20 PROCEDURE — 7100000011 HC PHASE II RECOVERY - ADDTL 15 MIN: Performed by: INTERNAL MEDICINE

## 2022-09-20 PROCEDURE — 45380 COLONOSCOPY AND BIOPSY: CPT | Performed by: INTERNAL MEDICINE

## 2022-09-20 PROCEDURE — 3700000001 HC ADD 15 MINUTES (ANESTHESIA): Performed by: INTERNAL MEDICINE

## 2022-09-20 RX ORDER — SODIUM CHLORIDE 9 MG/ML
INJECTION, SOLUTION INTRAVENOUS PRN
Status: CANCELLED | OUTPATIENT
Start: 2022-09-20

## 2022-09-20 RX ORDER — SODIUM CHLORIDE, SODIUM LACTATE, POTASSIUM CHLORIDE, CALCIUM CHLORIDE 600; 310; 30; 20 MG/100ML; MG/100ML; MG/100ML; MG/100ML
INJECTION, SOLUTION INTRAVENOUS CONTINUOUS PRN
Status: DISCONTINUED | OUTPATIENT
Start: 2022-09-20 | End: 2022-09-20 | Stop reason: SDUPTHER

## 2022-09-20 RX ORDER — DIPHENHYDRAMINE HYDROCHLORIDE 50 MG/ML
12.5 INJECTION INTRAMUSCULAR; INTRAVENOUS
Status: CANCELLED | OUTPATIENT
Start: 2022-09-20 | End: 2022-09-20

## 2022-09-20 RX ORDER — LIDOCAINE HYDROCHLORIDE 10 MG/ML
INJECTION, SOLUTION EPIDURAL; INFILTRATION; INTRACAUDAL; PERINEURAL PRN
Status: DISCONTINUED | OUTPATIENT
Start: 2022-09-20 | End: 2022-09-20 | Stop reason: SDUPTHER

## 2022-09-20 RX ORDER — SODIUM CHLORIDE 0.9 % (FLUSH) 0.9 %
5-40 SYRINGE (ML) INJECTION EVERY 12 HOURS SCHEDULED
Status: CANCELLED | OUTPATIENT
Start: 2022-09-20

## 2022-09-20 RX ORDER — ONDANSETRON 2 MG/ML
4 INJECTION INTRAMUSCULAR; INTRAVENOUS
Status: CANCELLED | OUTPATIENT
Start: 2022-09-20 | End: 2022-09-20

## 2022-09-20 RX ORDER — SODIUM CHLORIDE, SODIUM LACTATE, POTASSIUM CHLORIDE, CALCIUM CHLORIDE 600; 310; 30; 20 MG/100ML; MG/100ML; MG/100ML; MG/100ML
INJECTION, SOLUTION INTRAVENOUS CONTINUOUS
Status: DISCONTINUED | OUTPATIENT
Start: 2022-09-20 | End: 2022-09-20 | Stop reason: HOSPADM

## 2022-09-20 RX ORDER — SODIUM CHLORIDE 0.9 % (FLUSH) 0.9 %
5-40 SYRINGE (ML) INJECTION PRN
Status: CANCELLED | OUTPATIENT
Start: 2022-09-20

## 2022-09-20 RX ORDER — PROPOFOL 10 MG/ML
INJECTION, EMULSION INTRAVENOUS PRN
Status: DISCONTINUED | OUTPATIENT
Start: 2022-09-20 | End: 2022-09-20 | Stop reason: SDUPTHER

## 2022-09-20 RX ADMIN — SODIUM CHLORIDE, SODIUM LACTATE, POTASSIUM CHLORIDE, AND CALCIUM CHLORIDE: 600; 310; 30; 20 INJECTION, SOLUTION INTRAVENOUS at 08:37

## 2022-09-20 RX ADMIN — PROPOFOL 500 MG: 10 INJECTION, EMULSION INTRAVENOUS at 08:43

## 2022-09-20 RX ADMIN — SODIUM CHLORIDE, POTASSIUM CHLORIDE, SODIUM LACTATE AND CALCIUM CHLORIDE: 600; 310; 30; 20 INJECTION, SOLUTION INTRAVENOUS at 08:12

## 2022-09-20 RX ADMIN — LIDOCAINE HYDROCHLORIDE 50 MG: 10 INJECTION, SOLUTION EPIDURAL; INFILTRATION; INTRACAUDAL at 08:42

## 2022-09-20 ASSESSMENT — ENCOUNTER SYMPTOMS: SHORTNESS OF BREATH: 1

## 2022-09-20 ASSESSMENT — PAIN - FUNCTIONAL ASSESSMENT: PAIN_FUNCTIONAL_ASSESSMENT: 0-10

## 2022-09-20 NOTE — ANESTHESIA PRE PROCEDURE
Department of Anesthesiology  Preprocedure Note       Name:  William Butt   Age:  64 y.o.  :  1960                                          MRN:  [id number]         Date:  2022      Surgeon: Teressa Gorman):  Vargas Solano MD    Procedure: Procedure(s):  COLORECTAL CANCER SCREENING, NOT HIGH RISK    Medications prior to admission:   Prior to Admission medications    Medication Sig Start Date End Date Taking?  Authorizing Provider   Semaglutide (OZEMPIC, 1 MG/DOSE, SC) Inject into the skin once a week   Yes Historical Provider, MD   sotalol (BETAPACE) 80 MG tablet TAKE 1 TABLET BY MOUTH TWICE DAILY 22   ANTHONY Fontaine NP   ELIQUIS 5 MG TABS tablet Take 1 tablet by mouth twice daily 3/22/22   ANTHONY Fontaine NP   albuterol sulfate HFA (PROVENTIL;VENTOLIN;PROAIR) 108 (90 Base) MCG/ACT inhaler Inhale 2 puffs into the lungs every 4 hours as needed for Wheezing    Historical Provider, MD   apixaban (ELIQUIS) 5 MG TABS tablet Take 1 tablet by mouth 2 times daily 11/25/19 3/22/23  ANTHONY Santiago CNP   allopurinol (ZYLOPRIM) 300 MG tablet Take 300 mg by mouth daily    Historical Provider, MD   losartan (COZAAR) 50 MG tablet Take 50 mg by mouth 2 times daily    Historical Provider, MD   montelukast (SINGULAIR) 10 MG tablet Take 10 mg by mouth nightly    Historical Provider, MD   fenofibrate micronized (LOFIBRA) 200 MG capsule Take 200 mg by mouth every morning (before breakfast)    Historical Provider, MD   NIFEdipine (PROCARDIA XL) 90 MG extended release tablet Take 90 mg by mouth daily    Historical Provider, MD   hydrochlorothiazide (HYDRODIURIL) 25 MG tablet Take 25 mg by mouth daily    Historical Provider, MD   Misc Natural Products (OSTEO BI-FLEX ADV DOUBLE ST) TABS Take 1 tablet by mouth daily    Historical Provider, MD   Multiple Vitamins-Minerals (THERAPEUTIC MULTIVITAMIN-MINERALS) tablet Take 1 tablet by mouth daily    Historical Provider, MD   aspirin 81 MG EC tablet Take 81 mg by mouth daily    Historical Provider, MD   omeprazole (PRILOSEC) 40 MG delayed release capsule Take 40 mg by mouth daily    Historical Provider, MD   metFORMIN (GLUCOPHAGE) 500 MG tablet HOLD METFORMIN FOR 48 HOURS AFTER THE CARDIAC CATHETERIZATION  Patient taking differently: 500 mg 2 times daily 9/20/18   Modesto Platt MD       Current medications:    Current Facility-Administered Medications   Medication Dose Route Frequency Provider Last Rate Last Admin    lactated ringers infusion   IntraVENous Continuous Danielle MD Cornelius 100 mL/hr at 09/20/22 0812 New Bag at 09/20/22 6835       Allergies:     Allergies   Allergen Reactions    Levaquin [Levofloxacin] Palpitations    Penicillins Rash       Problem List:    Patient Active Problem List   Diagnosis Code    SOB (shortness of breath) R06.02    Dyspnea R06.00    PAF (paroxysmal atrial fibrillation) ([facility]) I48.0    Hypertension I10    Sinoatrial node dysfunction ([facility]) I49.5    Mild coronary artery disease I25.10    Class 3 severe obesity with body mass index (BMI) of 45.0 to 49.9 in adult ([facility] E66.01, Z68.42       Past Medical History:        Diagnosis Date    Arthritis     CPAP (continuous positive airway pressure) dependence     Diabetes mellitus ([facility]     GERD (gastroesophageal reflux disease)     Gout     Hyperlipidemia     Hypertension     Mild coronary artery disease 7/6/2020    Sinoatrial node dysfunction ([facility] 5/3/2019    5/2/2019  Converted AFL to NSR on amiodarone and dilt, placed on betapace 80 bid    Sleep apnea     SOB (shortness of breath) 7/17/2013       Past Surgical History:        Procedure Laterality Date    BACK SURGERY      CHOLECYSTECTOMY      DIAGNOSTIC CARDIAC CATH LAB PROCEDURE  09/2018    DIAGNOSTIC CARDIAC CATH LAB PROCEDURE  05/2019    HEEL SPUR SURGERY  08/08/2019    JOINT REPLACEMENT Bilateral     knees 2016       Social History:    Social History     Tobacco Use    Smoking status: Never    Smokeless tobacco: [facility]    Drug/Infectious Status (If Applicable):  No results found for: HIV, HEPCAB    COVID-19 Screening (If Applicable): No results found for: COVID19        Anesthesia Evaluation  Patient summary reviewed and Nursing notes reviewed  Airway: Mallampati: II  TM distance: >3 FB   Neck ROM: full  Mouth opening: > = 3 FB   Dental:          Pulmonary:normal exam    (+) shortness of breath: no interval change,  sleep apnea: on CPAP,                             Cardiovascular:    (+) hypertension: no interval change, CAD:, hyperlipidemia         Beta Blocker:  Dose within 24 Hrs         Neuro/Psych:   Negative Neuro/Psych ROS              GI/Hepatic/Renal:   (+) GERD: no interval change, bowel prep, morbid obesity          Endo/Other:    (+) blood dyscrasia: anticoagulation therapy:., .                 Abdominal:   (+) obese,           Vascular: Other Findings:           Anesthesia Plan      general     ASA 3       Induction: intravenous. Anesthetic plan and risks discussed with patient.                         ANTHONY Sutton - HIPOLITO   9/20/2022

## 2022-09-20 NOTE — OP NOTE
Patient: Maribel Perry : 1960  Med Rec#: [id number] Acc#: [id number]   Primary Care Provider Chase Núñez. Emi Toussaint DO, DO    Date of Procedure:  2022    Endoscopist: Monroe Baron MD    Referring Provider: Chase Toussaint DO, DO,     Operation Performed: Colonoscopy with biopsy polypectomy    Indications: screening    Anesthesia:  Sedation was administered by anesthesia who monitored the patient during the procedure. I met with Maribel Perry prior to procedure. We discussed the procedure itself, and I have discussed the risks of endoscopy (including-- but not limited to-- pain, discomfort, bleeding potentially requiring second endoscopic procedure and/or blood transfusion, organ perforation requiring operative repair, damage to organs near the colon, infection, aspiration, cardiopulmonary/allergic reaction), benefits, indications to endoscopy. Additionally, we discussed options other than colonoscopy. The patient expressed understanding. All questions answered. The patient decided to proceed with the procedure. Signed informed consent was placed on the chart. Blood Loss: minimal    Withdrawal time: > 6 minutes  Bowel Prep: adequate     Complications: no immediate complications    DESCRIPTION OF PROCEDURE:     A time out was performed. After written informed consent was obtained, the patient was placed in the left lateral position. The perianal area was inspected, and a digital rectal exam was performed. A rectal exam was performed: normal tone, no palpable lesions. At this point, a forward viewing Olympus colonoscope was inserted into the anus and carefully advanced to the cecum. The cecum was identified by the ileocecal valve and the appendiceal orifice. The colonoscope was then slowly withdrawn with careful inspection of the mucosa in a linear and circumferential fashion. The scope was retroflexed in the rectum.  Suction was utilized during the procedure to remove as much air as possible from the bowel. The colonoscope was removed from the patient, and the procedure was terminated. Findings are listed below. Findings: The mucosa appeared normal throughout the entire examined colon     In the the transverse colon, a 4 mm sessile polyp was removed completely with forceps polypectomy. There was  moderate-large amount of retained stool throughout the left colon. As much as possible, this was lavaged and suctioned. I feel I was able to rule out large lesion, but small polyps may have been missed. Retroflexion in the rectum was normal and revealed no further abnormalities         Recommendations:  1. Repeat colonoscopy: pending pathology - 1 yr maximum with 2-day prep. 2. Await biopsy results. Findings and recommendations were discussed w/ the patient. A copy of the images was provided.     Tanya Prince MD  9/20/2022  9:22 AM

## 2022-09-20 NOTE — H&P
Patient Name: Deidra Adams  : 1960  MRN: [id number]  DATE: 22    Allergies: Allergies   Allergen Reactions    Levaquin [Levofloxacin] Palpitations    Penicillins Rash        ENDOSCOPY  History and Physical    Procedure:    [] Diagnostic Colonoscopy       [x] Screening Colonoscopy  [] EGD      [] ERCP      [] EUS       [] Other    [x] Previous office notes/History and Physical reviewed from the patients chart. Please see EMR for further details of HPI. I have examined the patient's status immediately prior to the procedure and:      Indications/HPI:       [x] Screening              [] History of Polyps      []Fhx of colon CA/polyps []+Cologard/DNA/Stool Testing      Anesthesia:   [x] MAC [] Moderate Sedation   [] General   [] None     ROS: 12 pt review of systems was negative unless stated above    Medications:   Prior to Admission medications    Medication Sig Start Date End Date Taking?  Authorizing Provider   Semaglutide (OZEMPIC, 1 MG/DOSE, SC) Inject into the skin once a week   Yes Historical Provider, MD   sotalol (BETAPACE) 80 MG tablet TAKE 1 TABLET BY MOUTH TWICE DAILY 22   ANTHONY Robles - NP   ELIQUIS 5 MG TABS tablet Take 1 tablet by mouth twice daily 3/22/22   ANTHONY Robles - NP   albuterol sulfate HFA (PROVENTIL;VENTOLIN;PROAIR) 108 (90 Base) MCG/ACT inhaler Inhale 2 puffs into the lungs every 4 hours as needed for Wheezing    Historical Provider, MD   apixaban (ELIQUIS) 5 MG TABS tablet Take 1 tablet by mouth 2 times daily 11/25/19 3/22/23  ANTHONY Adame - CNP   allopurinol (ZYLOPRIM) 300 MG tablet Take 300 mg by mouth daily    Historical Provider, MD   losartan (COZAAR) 50 MG tablet Take 50 mg by mouth 2 times daily    Historical Provider, MD   montelukast (SINGULAIR) 10 MG tablet Take 10 mg by mouth nightly    Historical Provider, MD   fenofibrate micronized (LOFIBRA) 200 MG capsule Take 200 mg by mouth every morning (before breakfast)    Historical Provider, MD   NIFEdipine (PROCARDIA XL) 90 MG extended release tablet Take 90 mg by mouth daily    Historical Provider, MD   hydrochlorothiazide (HYDRODIURIL) 25 MG tablet Take 25 mg by mouth daily    Historical Provider, MD   Misc Natural Products (OSTEO BI-FLEX ADV DOUBLE ST) TABS Take 1 tablet by mouth daily    Historical Provider, MD   Multiple Vitamins-Minerals (THERAPEUTIC MULTIVITAMIN-MINERALS) tablet Take 1 tablet by mouth daily    Historical Provider, MD   aspirin 81 MG EC tablet Take 81 mg by mouth daily    Historical Provider, MD   omeprazole (PRILOSEC) 40 MG delayed release capsule Take 40 mg by mouth daily    Historical Provider, MD   metFORMIN (GLUCOPHAGE) 500 MG tablet HOLD METFORMIN FOR 48 HOURS AFTER THE CARDIAC CATHETERIZATION  Patient taking differently: 500 mg 2 times daily 9/20/18   Gillian Dawkins MD       Past Medical History:  Past Medical History:   Diagnosis Date    Arthritis     CPAP (continuous positive airway pressure) dependence     Diabetes mellitus (HCC)     GERD (gastroesophageal reflux disease)     Gout     Hyperlipidemia     Hypertension     Mild coronary artery disease 7/6/2020    Sinoatrial node dysfunction ([address] 5/3/2019    5/2/2019  Converted AFL to NSR on amiodarone and dilt, placed on betapace 80 bid    Sleep apnea     SOB (shortness of breath) 7/17/2013       Past Surgical History:  Past Surgical History:   Procedure Laterality Date    BACK SURGERY      CHOLECYSTECTOMY      DIAGNOSTIC CARDIAC CATH LAB PROCEDURE  09/2018    DIAGNOSTIC CARDIAC CATH LAB PROCEDURE  05/2019    HEEL SPUR SURGERY  08/08/2019    JOINT REPLACEMENT Bilateral     knees 2016       Social History:  Social History     Tobacco Use    Smoking status: Never    Smokeless tobacco: Never   Substance Use Topics    Alcohol use: No    Drug use: Never       Vital Signs:   Vitals:    09/20/22 0800   BP: 137/86   Pulse: 72   Resp: 16   Temp: 98.8 °F (37.1 °C)   SpO2: 99%        Physical Exam:  Cardiac:  [x]WNL []Comments:  Pulmonary:  [x]WNL   []Comments:  Neuro/Mental Status:  [x]WNL  []Comments:  Abdominal:  [x]WNL    []Comments:  Other:   []WNL  []Comments:    Informed Consent:  The risks and benefits of the procedure have been discussed with either the patient or if they cannot consent, their representative. Assessment:  Patient examined and appropriate for planned sedation and procedure. Plan:  Proceed with planned sedation and procedure as above.     Beverley Sanchez MD

## 2022-09-20 NOTE — ANESTHESIA POSTPROCEDURE EVALUATION
Department of Anesthesiology  Postprocedure Note    Patient: Kaylin Miles  MRN: [id number]  YOB: 1960  Date of evaluation: 9/20/2022      Procedure Summary     Date: 09/20/22 Room / Location: [address]    Anesthesia Start: 1808 Anesthesia Stop: 9629    Procedure: COLONOSCOPY POLYPECTOMY SNARE/COLD BIOPSY Diagnosis:       Screen for colon cancer      FH: colon cancer      FH: colon polyps      (Screen for colon cancer [Z12.11])      (FH: colon cancer [Z80.0])      (FH: colon polyps [Z83.71])    Surgeons: Ira Gaitan MD Responsible Provider: ANTHONY Melchor CRNA    Anesthesia Type: general ASA Status: 3          Anesthesia Type: No value filed.     Jossue Phase I:      Jossue Phase II:        Anesthesia Post Evaluation    Patient location during evaluation: bedside  Patient participation: complete - patient participated  Level of consciousness: sleepy but conscious  Pain score: 0  Airway patency: patent  Nausea & Vomiting: no nausea and no vomiting  Complications: no  Cardiovascular status: hemodynamically stable  Respiratory status: acceptable  Hydration status: euvolemic

## 2022-09-20 NOTE — DISCHARGE INSTRUCTIONS
Recommendations:  1. Repeat colonoscopy: pending pathology - 1 yr maximum with 2-day prep. 2. Await biopsy results. Colonoscopy: What to Expect at [address] [facility]  After a colonoscopy, you'll stay at the clinic until you wake up. Then you can go home. But you'll need to arrange for a ride. Your doctor will tell you when you can eat and do your other usual activities. Your doctor will talk to you about when you'll need your next colonoscopy. Your doctor can help you decide how often you need to be checked. This will depend on the results of your test and your risk for colorectal cancer. After the test, you may be bloated or have gas pains. You may need to pass gas. If a biopsy was done or a polyp was removed, you may have streaks of blood in your stool (feces) for a few days. Problems such as heavy rectal bleeding may not occur until several weeks after the test. This isn't common. But it can happen after polyps are removed. This care sheet gives you a general idea about how long it will take for you to recover. But each person recovers at a different pace. Follow the steps below to get better as quickly as possible. How can you care for yourself at home? Activity    Rest when you feel tired. You can do your normal activities when it feels okay to do so. Diet    Follow your doctor's directions for eating. Unless your doctor has told you not to, drink plenty of fluids. This helps to replace the fluids that were lost during the colon prep. Do not drink alcohol. Medicines    Your doctor will tell you if and when you can restart your medicines. You will also be given instructions about taking any new medicines. If you stopped taking aspirin or some other blood thinner, your doctor will tell you when to start taking it again.      If polyps were removed or a biopsy was done during the test, your doctor may tell you not to take aspirin or other anti-inflammatory medicines for a few days. These include ibuprofen (Advil, Motrin) and naproxen (Aleve). Other instructions    For your safety, do not drive or operate machinery until the medicine wears off and you can think clearly. Your doctor may tell you not to drive or operate machinery until the day after your test.     Do not sign legal documents or make major decisions until the medicine wears off and you can think clearly. The anesthesia can make it hard for you to fully understand what you are agreeing to. Follow-up care is a key part of your treatment and safety. Be sure to make and go to all appointments, and call your doctor if you are having problems. It's also a good idea to know your test results and keep a list of the medicines you take. When should you call for help? Call 911 anytime you think you may need emergency care. For example, call if:    You passed out (lost consciousness). You pass maroon or bloody stools. You have trouble breathing. Call your doctor now or seek immediate medical care if:    You have pain that does not get better after you take pain medicine. You are sick to your stomach or cannot drink fluids. You have new or worse belly pain. You have blood in your stools. You have a fever. You cannot pass stools or gas. Watch closely for changes in your health, and be sure to contact your doctor if you have any problems. Where can you learn more? Go to [email or web address] and sign in to your [employer] account. Enter E264 in the [facility] box to learn more about \"Colonoscopy: What to Expect at Home. \"     If you do not have an account, please click on the \"Sign Up Now\" link. Current as of: May 4, 2022               Content Version: 13.4  © 9638-7667 [employer]. Care instructions adapted under license by [facility] ([facility]).  If you have questions about a medical condition or this instruction, always ask your healthcare professional. [employer] disclaims any warranty or liability for your use of this information.

## 2022-09-23 ENCOUNTER — TELEPHONE (OUTPATIENT)
Dept: CARDIOLOGY CLINIC | Age: 62
End: 2022-09-23

## 2022-09-26 ENCOUNTER — OFFICE VISIT (OUTPATIENT)
Dept: CARDIOLOGY CLINIC | Age: 62
End: 2022-09-26
Payer: COMMERCIAL

## 2022-09-26 VITALS
WEIGHT: 304 LBS | DIASTOLIC BLOOD PRESSURE: 84 MMHG | OXYGEN SATURATION: 96 % | BODY MASS INDEX: 45.03 KG/M2 | HEART RATE: 68 BPM | SYSTOLIC BLOOD PRESSURE: 138 MMHG | HEIGHT: 69 IN

## 2022-09-26 DIAGNOSIS — I10 ESSENTIAL HYPERTENSION: ICD-10-CM

## 2022-09-26 DIAGNOSIS — I25.10 CORONARY ARTERY DISEASE INVOLVING NATIVE CORONARY ARTERY OF NATIVE HEART WITHOUT ANGINA PECTORIS: ICD-10-CM

## 2022-09-26 DIAGNOSIS — I48.0 PAF (PAROXYSMAL ATRIAL FIBRILLATION) (HCC): Primary | ICD-10-CM

## 2022-09-26 PROCEDURE — 93000 ELECTROCARDIOGRAM COMPLETE: CPT | Performed by: NURSE PRACTITIONER

## 2022-09-26 PROCEDURE — 99214 OFFICE O/P EST MOD 30 MIN: CPT | Performed by: NURSE PRACTITIONER

## 2022-09-26 ASSESSMENT — ENCOUNTER SYMPTOMS
CHEST TIGHTNESS: 0
SHORTNESS OF BREATH: 0
COUGH: 0
WHEEZING: 0
SINUS PAIN: 0

## 2022-09-26 NOTE — PROGRESS NOTES
tablet Take 50 mg by mouth 2 times daily      montelukast (SINGULAIR) 10 MG tablet Take 10 mg by mouth nightly      fenofibrate micronized (LOFIBRA) 200 MG capsule Take 200 mg by mouth every morning (before breakfast)      NIFEdipine (PROCARDIA XL) 90 MG extended release tablet Take 90 mg by mouth daily      hydrochlorothiazide (HYDRODIURIL) 25 MG tablet Take 25 mg by mouth daily      Misc Natural Products (OSTEO BI-FLEX ADV DOUBLE ST) TABS Take 1 tablet by mouth daily      Multiple Vitamins-Minerals (THERAPEUTIC MULTIVITAMIN-MINERALS) tablet Take 1 tablet by mouth daily      aspirin 81 MG EC tablet Take 81 mg by mouth daily      omeprazole (PRILOSEC) 40 MG delayed release capsule Take 40 mg by mouth daily      metFORMIN (GLUCOPHAGE) 500 MG tablet HOLD METFORMIN FOR 48 HOURS AFTER THE CARDIAC CATHETERIZATION (Patient taking differently: 500 mg 2 times daily) 60 tablet 3     No current facility-administered medications for this visit.      Allergies: Levaquin [levofloxacin] and Penicillins  Past Medical History:   Diagnosis Date    Arthritis     CPAP (continuous positive airway pressure) dependence     Diabetes mellitus (HCC)     GERD (gastroesophageal reflux disease)     Gout     Hyperlipidemia     Hypertension     Mild coronary artery disease 7/6/2020    Sinoatrial node dysfunction ([facility] [address] 5/3/2019    5/2/2019  Converted AFL to NSR on amiodarone and dilt, placed on betapace 80 bid    Sleep apnea     SOB (shortness of breath) 7/17/2013     Past Surgical History:   Procedure Laterality Date    BACK SURGERY      CHOLECYSTECTOMY      COLONOSCOPY N/A 09/20/2022    Dr Lopez Lim, large amount of retained stool throughout the left colon, as much as possible was lavaged and suctioned-AP x 1, 1 yr recall    DIAGNOSTIC CARDIAC CATH LAB PROCEDURE  09/2018    DIAGNOSTIC CARDIAC CATH LAB PROCEDURE  05/2019    HEEL SPUR SURGERY  08/08/2019    JOINT REPLACEMENT Bilateral 2016    knees     Family History   Problem Relation Age of Onset    Cancer Mother     Coronary Art Dis Father     Heart Attack Father     Heart Disease Father     High Blood Pressure Father     High Cholesterol Father     No Known Problems Sister     No Known Problems Brother     No Known Problems Brother      Social History     Tobacco Use    Smoking status: Never    Smokeless tobacco: Never   Substance Use Topics    Alcohol use: No          Review of Systems:    Review of Systems   Constitutional:  Negative for activity change, chills, diaphoresis, fatigue and fever. HENT:  Negative for congestion and sinus pain. Respiratory:  Negative for cough, chest tightness, shortness of breath and wheezing. Cardiovascular:  Negative for chest pain, palpitations and leg swelling. Neurological:  Negative for dizziness, syncope, light-headedness and headaches. Psychiatric/Behavioral:  Negative for confusion. The patient is not nervous/anxious. Objective:    /84   Pulse 68   Ht 5' 9\" (1.753 m)   Wt (!) 304 lb (137.9 kg)   SpO2 96%   BMI 44.89 kg/m²     GENERAL - well developed and well nourished, conversant  HEENT -  PERRLA, Hearing appears normal, gentleman lids are normal.  External inspection of ears and nose appear normal.  NECK - no thyromegaly, no JVD, trachea is in the midline  CARDIOVASCULAR - PMI is in the mid line clavicular position, Normal S1 and S2 with no systolic murmur. No S3 or S4    PULMONARY - no respiratory distress. No wheezes or rales. Lungs are clear to ausculation, normal respiratory effort. ABDOMEN  - soft, non tender, no rebound  MUSCULOSKELETAL  - range of motion of the upper and lower extermites appears normal and equal and is without pain   EXTREMITIES - no significant edema   NEUROLOGIC - gait and station are normal  SKIN - turgor is normal, can warm and dry.   PSYCHIATRIC - normal mood and affect, alert and orientated x 3,      ASSESSMENT:    ALL THE CARDIOLOGY PROBLEMS ARE LISTED ABOVE; HOWEVER, THE FOLLOWING SPECIFIC CARDIAC PROBLEMS / CONDITIONS WERE ADDRESSED AND TREATED DURING THE OFFICE VISIT TODAY:                                                                                            MEDICAL DECISION MAKING             Cardiac Specific Problem / Diagnosis  Discussion and Data Reviewed Diagnostic Procedures Ordered Management Options Selected           1. PAF  Well Controlled   Review and summation of old records:    EKG in the office today showing sinus rhythm with heart rate of 68 bpm.  QTc 412. He is on Betapace 80 mg twice daily. Eliquis 5 mg twice daily for stroke prevention. No bleeding issues. Yes Continue current medications:     Yes           2. Hypertension  Stable   Blood pressure slightly elevated today. Patient was running late and was stressed about being late. Home blood pressures are running 733 systolic over 09T diastolic. He is on hydrochlorothiazide 25 mg daily. Losartan 50 mg twice daily. Procardia 90 mg daily. No Continue current medications:    Yes           3. Mild CAD Stable No chest pain. EKG shows no acute changes. He is on aspirin. No Continue current medications: Yes                     Orders Placed This Encounter   Procedures    EKG 12 lead     Order Specific Question:   Reason for Exam?     Answer:   Irregular heart rate     No orders of the defined types were placed in this encounter. Discussed with patient. Return in about 6 months (around 3/26/2023) for Dr Glo Moritz . I greatly appreciate the opportunity to meet Estee Rochaon and your confidence in allowing me to participate in his cardiovascular care. ANTHONY Jones NP  9/26/2022 9:17 AM CDT                    This dictation was generated by voice recognition computer software. Although all attempts are made to edit dictation for accuracy, there may be errors in the transcription that are not intended.

## 2023-03-22 ENCOUNTER — OFFICE VISIT (OUTPATIENT)
Dept: CARDIOLOGY CLINIC | Age: 63
End: 2023-03-22
Payer: COMMERCIAL

## 2023-03-22 VITALS
SYSTOLIC BLOOD PRESSURE: 138 MMHG | BODY MASS INDEX: 43.99 KG/M2 | HEIGHT: 69 IN | DIASTOLIC BLOOD PRESSURE: 88 MMHG | WEIGHT: 297 LBS | HEART RATE: 74 BPM

## 2023-03-22 DIAGNOSIS — I48.0 PAF (PAROXYSMAL ATRIAL FIBRILLATION) (HCC): Primary | ICD-10-CM

## 2023-03-22 PROCEDURE — 99214 OFFICE O/P EST MOD 30 MIN: CPT | Performed by: INTERNAL MEDICINE

## 2023-03-22 PROCEDURE — 3075F SYST BP GE 130 - 139MM HG: CPT | Performed by: INTERNAL MEDICINE

## 2023-03-22 PROCEDURE — 3079F DIAST BP 80-89 MM HG: CPT | Performed by: INTERNAL MEDICINE

## 2023-03-22 PROCEDURE — 93000 ELECTROCARDIOGRAM COMPLETE: CPT | Performed by: INTERNAL MEDICINE

## 2023-03-22 RX ORDER — ATORVASTATIN CALCIUM 10 MG/1
10 TABLET, FILM COATED ORAL DAILY
Qty: 90 TABLET | Refills: 3 | Status: ON HOLD | OUTPATIENT
Start: 2023-03-22

## 2023-03-22 ASSESSMENT — ENCOUNTER SYMPTOMS
DIARRHEA: 0
SHORTNESS OF BREATH: 0
VOMITING: 0
COUGH: 0
ABDOMINAL PAIN: 0
WHEEZING: 0
ABDOMINAL DISTENTION: 0
BACK PAIN: 0
BLOOD IN STOOL: 0

## 2023-03-22 NOTE — PROGRESS NOTES
7/17/2013       Past Surgical History:      Procedure Laterality Date    BACK SURGERY      CHOLECYSTECTOMY      COLONOSCOPY N/A 09/20/2022    Dr Ji Basket, large amount of retained stool throughout the left colon, as much as possible was lavaged and suctioned-AP x 1, 1 yr recall    DIAGNOSTIC CARDIAC CATH LAB PROCEDURE  09/2018    DIAGNOSTIC CARDIAC CATH LAB PROCEDURE  05/2019    HEEL SPUR SURGERY  08/08/2019    JOINT REPLACEMENT Bilateral 2016    knees       Medications:  Current Outpatient Medications   Medication Sig Dispense Refill    atorvastatin (LIPITOR) 10 MG tablet Take 1 tablet by mouth daily 90 tablet 3    apixaban (ELIQUIS) 5 MG TABS tablet Take 1 tablet by mouth 2 times daily 60 tablet 5    Semaglutide (OZEMPIC, 1 MG/DOSE, SC) Inject into the skin once a week      sotalol (BETAPACE) 80 MG tablet TAKE 1 TABLET BY MOUTH TWICE DAILY 180 tablet 1    albuterol sulfate HFA (PROVENTIL;VENTOLIN;PROAIR) 108 (90 Base) MCG/ACT inhaler Inhale 2 puffs into the lungs every 4 hours as needed for Wheezing      allopurinol (ZYLOPRIM) 300 MG tablet Take 300 mg by mouth daily      losartan (COZAAR) 50 MG tablet Take 50 mg by mouth 2 times daily      montelukast (SINGULAIR) 10 MG tablet Take 10 mg by mouth nightly      fenofibrate micronized (LOFIBRA) 200 MG capsule Take 200 mg by mouth every morning (before breakfast)      NIFEdipine (PROCARDIA XL) 90 MG extended release tablet Take 90 mg by mouth daily      hydrochlorothiazide (HYDRODIURIL) 25 MG tablet Take 25 mg by mouth daily      Misc Natural Products (OSTEO BI-FLEX ADV DOUBLE ST) TABS Take 1 tablet by mouth daily      Multiple Vitamins-Minerals (THERAPEUTIC MULTIVITAMIN-MINERALS) tablet Take 1 tablet by mouth daily      aspirin 81 MG EC tablet Take 81 mg by mouth daily      omeprazole (PRILOSEC) 40 MG delayed release capsule Take 40 mg by mouth daily      metFORMIN (GLUCOPHAGE) 500 MG tablet HOLD METFORMIN FOR 48 HOURS AFTER THE CARDIAC CATHETERIZATION

## 2023-03-26 ENCOUNTER — APPOINTMENT (OUTPATIENT)
Dept: GENERAL RADIOLOGY | Age: 63
End: 2023-03-26
Payer: COMMERCIAL

## 2023-03-26 ENCOUNTER — HOSPITAL ENCOUNTER (OUTPATIENT)
Age: 63
Setting detail: OBSERVATION
Discharge: HOME | End: 2023-03-28
Attending: HOSPITALIST
Payer: COMMERCIAL

## 2023-03-26 DIAGNOSIS — I47.29 NONSUSTAINED VENTRICULAR TACHYCARDIA (HCC): ICD-10-CM

## 2023-03-26 DIAGNOSIS — I48.91 ATRIAL FIBRILLATION WITH RVR (HCC): Primary | ICD-10-CM

## 2023-03-26 LAB
25(OH)D3 SERPL-MCNC: 29.5 NG/ML
ALBUMIN SERPL-MCNC: 4.2 G/DL (ref 3.5–5.2)
ALP SERPL-CCNC: 62 U/L (ref 40–130)
ALT SERPL-CCNC: 24 U/L (ref 5–41)
AMPHET UR QL SCN: NEGATIVE
ANION GAP SERPL CALCULATED.3IONS-SCNC: 11 MMOL/L (ref 7–19)
AST SERPL-CCNC: 20 U/L (ref 5–40)
BARBITURATES UR QL SCN: NEGATIVE
BASOPHILS # BLD: 0.1 K/UL (ref 0–0.2)
BASOPHILS NFR BLD: 1.1 % (ref 0–1)
BENZODIAZ UR QL SCN: NEGATIVE
BILIRUB SERPL-MCNC: 0.5 MG/DL (ref 0.2–1.2)
BILIRUB UR QL STRIP: NEGATIVE
BNP BLD-MCNC: 1601 PG/ML (ref 0–900)
BUN SERPL-MCNC: 15 MG/DL (ref 8–23)
BUPRENORPHINE URINE: NEGATIVE
CALCIUM SERPL-MCNC: 9.4 MG/DL (ref 8.8–10.2)
CANNABINOIDS UR QL SCN: NEGATIVE
CHLORIDE SERPL-SCNC: 108 MMOL/L (ref 98–111)
CK SERPL-CCNC: 58 U/L (ref 39–308)
CLARITY UR: CLEAR
CO2 SERPL-SCNC: 24 MMOL/L (ref 22–29)
COCAINE UR QL SCN: NEGATIVE
COLOR UR: YELLOW
CREAT SERPL-MCNC: 0.8 MG/DL (ref 0.5–1.2)
D DIMER PPP FEU-MCNC: <0.27 UG/ML FEU (ref 0–0.48)
DRUG SCREEN COMMENT UR-IMP: NORMAL
EOSINOPHIL # BLD: 0.4 K/UL (ref 0–0.6)
EOSINOPHIL NFR BLD: 4.7 % (ref 0–5)
ERYTHROCYTE [DISTWIDTH] IN BLOOD BY AUTOMATED COUNT: 14.7 % (ref 11.5–14.5)
GLUCOSE SERPL-MCNC: 102 MG/DL (ref 74–109)
GLUCOSE UR STRIP.AUTO-MCNC: NEGATIVE MG/DL
HCT VFR BLD AUTO: 47.6 % (ref 42–52)
HGB BLD-MCNC: 16.5 G/DL (ref 14–18)
HGB UR STRIP.AUTO-MCNC: NEGATIVE MG/L
IMM GRANULOCYTES # BLD: 0 K/UL
KETONES UR STRIP.AUTO-MCNC: NEGATIVE MG/DL
LEUKOCYTE ESTERASE UR QL STRIP.AUTO: NEGATIVE
LYMPHOCYTES # BLD: 2.9 K/UL (ref 1.1–4.5)
LYMPHOCYTES NFR BLD: 34.6 % (ref 20–40)
MAGNESIUM SERPL-MCNC: 2.1 MG/DL (ref 1.6–2.4)
MCH RBC QN AUTO: 31.1 PG (ref 27–31)
MCHC RBC AUTO-ENTMCNC: 34.7 G/DL (ref 33–37)
MCV RBC AUTO: 89.6 FL (ref 80–94)
METHADONE UR QL SCN: NEGATIVE
METHAMPHETAMINE, URINE: NEGATIVE
MONOCYTES # BLD: 0.8 K/UL (ref 0–0.9)
MONOCYTES NFR BLD: 10.1 % (ref 0–10)
NEUTROPHILS # BLD: 4.1 K/UL (ref 1.5–7.5)
NEUTS SEG NFR BLD: 49.3 % (ref 50–65)
NITRITE UR QL STRIP.AUTO: NEGATIVE
OPIATES UR QL SCN: NEGATIVE
OXYCODONE UR QL SCN: NEGATIVE
PCP UR QL SCN: NEGATIVE
PH UR STRIP.AUTO: 6.5 [PH] (ref 5–8)
PLATELET # BLD AUTO: 323 K/UL (ref 130–400)
PMV BLD AUTO: 9.5 FL (ref 9.4–12.4)
POTASSIUM SERPL-SCNC: 4 MMOL/L (ref 3.5–5)
PROPOXYPH UR QL SCN: NEGATIVE
PROT SERPL-MCNC: 7 G/DL (ref 6.6–8.7)
PROT UR STRIP.AUTO-MCNC: NEGATIVE MG/DL
RBC # BLD AUTO: 5.31 M/UL (ref 4.7–6.1)
SARS-COV-2 RDRP RESP QL NAA+PROBE: NOT DETECTED
SODIUM SERPL-SCNC: 143 MMOL/L (ref 136–145)
SP GR UR STRIP.AUTO: 1.01 (ref 1–1.03)
TRICYCLIC, URINE: NEGATIVE
TROPONIN T SERPL-MCNC: <0.01 NG/ML (ref 0–0.03)
TROPONIN T SERPL-MCNC: <0.01 NG/ML (ref 0–0.03)
TSH SERPL DL<=0.005 MIU/L-ACNC: 2.8 UIU/ML (ref 0.27–4.2)
URATE SERPL-MCNC: 6.1 MG/DL (ref 3.4–7)
UROBILINOGEN UR STRIP.AUTO-MCNC: 1 E.U./DL
WBC # BLD AUTO: 8.3 K/UL (ref 4.8–10.8)

## 2023-03-26 PROCEDURE — 96376 TX/PRO/DX INJ SAME DRUG ADON: CPT

## 2023-03-26 PROCEDURE — 96374 THER/PROPH/DIAG INJ IV PUSH: CPT

## 2023-03-26 PROCEDURE — 84484 ASSAY OF TROPONIN QUANT: CPT

## 2023-03-26 PROCEDURE — 99285 EMERGENCY DEPT VISIT HI MDM: CPT

## 2023-03-26 PROCEDURE — 2140000000 HC CCU INTERMEDIATE R&B

## 2023-03-26 PROCEDURE — 81003 URINALYSIS AUTO W/O SCOPE: CPT

## 2023-03-26 PROCEDURE — 87635 SARS-COV-2 COVID-19 AMP PRB: CPT

## 2023-03-26 PROCEDURE — 6370000000 HC RX 637 (ALT 250 FOR IP): Performed by: HOSPITALIST

## 2023-03-26 PROCEDURE — 96361 HYDRATE IV INFUSION ADD-ON: CPT

## 2023-03-26 PROCEDURE — 82550 ASSAY OF CK (CPK): CPT

## 2023-03-26 PROCEDURE — 71045 X-RAY EXAM CHEST 1 VIEW: CPT

## 2023-03-26 PROCEDURE — 2580000003 HC RX 258: Performed by: PHYSICIAN ASSISTANT

## 2023-03-26 PROCEDURE — 96365 THER/PROPH/DIAG IV INF INIT: CPT

## 2023-03-26 PROCEDURE — 85379 FIBRIN DEGRADATION QUANT: CPT

## 2023-03-26 PROCEDURE — 2580000003 HC RX 258: Performed by: HOSPITALIST

## 2023-03-26 PROCEDURE — G0378 HOSPITAL OBSERVATION PER HR: HCPCS

## 2023-03-26 PROCEDURE — 85025 COMPLETE CBC W/AUTO DIFF WBC: CPT

## 2023-03-26 PROCEDURE — 2500000003 HC RX 250 WO HCPCS: Performed by: PHYSICIAN ASSISTANT

## 2023-03-26 PROCEDURE — 80306 DRUG TEST PRSMV INSTRMNT: CPT

## 2023-03-26 PROCEDURE — 84443 ASSAY THYROID STIM HORMONE: CPT

## 2023-03-26 PROCEDURE — 84550 ASSAY OF BLOOD/URIC ACID: CPT

## 2023-03-26 PROCEDURE — 82306 VITAMIN D 25 HYDROXY: CPT

## 2023-03-26 PROCEDURE — 80053 COMPREHEN METABOLIC PANEL: CPT

## 2023-03-26 PROCEDURE — 83735 ASSAY OF MAGNESIUM: CPT

## 2023-03-26 PROCEDURE — 83880 ASSAY OF NATRIURETIC PEPTIDE: CPT

## 2023-03-26 PROCEDURE — 36415 COLL VENOUS BLD VENIPUNCTURE: CPT

## 2023-03-26 RX ORDER — SODIUM CHLORIDE 0.9 % (FLUSH) 0.9 %
5-40 SYRINGE (ML) INJECTION EVERY 12 HOURS SCHEDULED
Status: DISCONTINUED | OUTPATIENT
Start: 2023-03-26 | End: 2023-03-28 | Stop reason: HOSPADM

## 2023-03-26 RX ORDER — ACETAMINOPHEN 325 MG/1
650 TABLET ORAL EVERY 6 HOURS PRN
Status: DISCONTINUED | OUTPATIENT
Start: 2023-03-26 | End: 2023-03-28 | Stop reason: HOSPADM

## 2023-03-26 RX ORDER — ATORVASTATIN CALCIUM 20 MG/1
10 TABLET, FILM COATED ORAL DAILY
Status: DISCONTINUED | OUTPATIENT
Start: 2023-03-27 | End: 2023-03-28 | Stop reason: HOSPADM

## 2023-03-26 RX ORDER — ONDANSETRON 2 MG/ML
4 INJECTION INTRAMUSCULAR; INTRAVENOUS EVERY 6 HOURS PRN
Status: DISCONTINUED | OUTPATIENT
Start: 2023-03-26 | End: 2023-03-26

## 2023-03-26 RX ORDER — POLYETHYLENE GLYCOL 3350 17 G/17G
17 POWDER, FOR SOLUTION ORAL DAILY PRN
Status: DISCONTINUED | OUTPATIENT
Start: 2023-03-26 | End: 2023-03-28 | Stop reason: HOSPADM

## 2023-03-26 RX ORDER — ERGOCALCIFEROL 1.25 MG/1
50000 CAPSULE ORAL WEEKLY
Status: DISCONTINUED | OUTPATIENT
Start: 2023-03-26 | End: 2023-03-28 | Stop reason: HOSPADM

## 2023-03-26 RX ORDER — LOSARTAN POTASSIUM 25 MG/1
50 TABLET ORAL 2 TIMES DAILY
Status: DISCONTINUED | OUTPATIENT
Start: 2023-03-27 | End: 2023-03-27

## 2023-03-26 RX ORDER — MONTELUKAST SODIUM 10 MG/1
10 TABLET ORAL NIGHTLY
Status: DISCONTINUED | OUTPATIENT
Start: 2023-03-26 | End: 2023-03-28 | Stop reason: HOSPADM

## 2023-03-26 RX ORDER — SODIUM CHLORIDE 9 MG/ML
INJECTION, SOLUTION INTRAVENOUS PRN
Status: DISCONTINUED | OUTPATIENT
Start: 2023-03-26 | End: 2023-03-28 | Stop reason: HOSPADM

## 2023-03-26 RX ORDER — ONDANSETRON 4 MG/1
4 TABLET, ORALLY DISINTEGRATING ORAL EVERY 8 HOURS PRN
Status: DISCONTINUED | OUTPATIENT
Start: 2023-03-26 | End: 2023-03-26

## 2023-03-26 RX ORDER — HYDROCHLOROTHIAZIDE 25 MG/1
25 TABLET ORAL DAILY
Status: DISCONTINUED | OUTPATIENT
Start: 2023-03-27 | End: 2023-03-27

## 2023-03-26 RX ORDER — PANTOPRAZOLE SODIUM 40 MG/1
40 TABLET, DELAYED RELEASE ORAL
Status: DISCONTINUED | OUTPATIENT
Start: 2023-03-27 | End: 2023-03-28 | Stop reason: HOSPADM

## 2023-03-26 RX ORDER — ASPIRIN 81 MG/1
81 TABLET ORAL DAILY
Status: DISCONTINUED | OUTPATIENT
Start: 2023-03-27 | End: 2023-03-28 | Stop reason: HOSPADM

## 2023-03-26 RX ORDER — ALLOPURINOL 100 MG/1
100 TABLET ORAL DAILY
Status: DISCONTINUED | OUTPATIENT
Start: 2023-03-27 | End: 2023-03-28 | Stop reason: HOSPADM

## 2023-03-26 RX ORDER — FENOFIBRATE 54 MG/1
54 TABLET ORAL DAILY
Status: DISCONTINUED | OUTPATIENT
Start: 2023-03-27 | End: 2023-03-28 | Stop reason: HOSPADM

## 2023-03-26 RX ORDER — NIFEDIPINE 90 MG/1
90 TABLET, EXTENDED RELEASE ORAL DAILY
Status: DISCONTINUED | OUTPATIENT
Start: 2023-03-26 | End: 2023-03-28

## 2023-03-26 RX ORDER — SODIUM CHLORIDE 0.9 % (FLUSH) 0.9 %
5-40 SYRINGE (ML) INJECTION PRN
Status: DISCONTINUED | OUTPATIENT
Start: 2023-03-26 | End: 2023-03-28 | Stop reason: HOSPADM

## 2023-03-26 RX ORDER — SODIUM CHLORIDE 9 MG/ML
INJECTION, SOLUTION INTRAVENOUS CONTINUOUS
Status: DISCONTINUED | OUTPATIENT
Start: 2023-03-26 | End: 2023-03-26

## 2023-03-26 RX ORDER — DILTIAZEM HYDROCHLORIDE 5 MG/ML
20 INJECTION INTRAVENOUS ONCE
Status: COMPLETED | OUTPATIENT
Start: 2023-03-26 | End: 2023-03-26

## 2023-03-26 RX ORDER — SOTALOL HYDROCHLORIDE 80 MG/1
80 TABLET ORAL 2 TIMES DAILY
Status: DISCONTINUED | OUTPATIENT
Start: 2023-03-26 | End: 2023-03-28 | Stop reason: HOSPADM

## 2023-03-26 RX ORDER — ENOXAPARIN SODIUM 100 MG/ML
30 INJECTION SUBCUTANEOUS 2 TIMES DAILY
Status: DISCONTINUED | OUTPATIENT
Start: 2023-03-27 | End: 2023-03-26

## 2023-03-26 RX ORDER — 0.9 % SODIUM CHLORIDE 0.9 %
1000 INTRAVENOUS SOLUTION INTRAVENOUS ONCE
Status: COMPLETED | OUTPATIENT
Start: 2023-03-26 | End: 2023-03-26

## 2023-03-26 RX ORDER — ACETAMINOPHEN 650 MG/1
650 SUPPOSITORY RECTAL EVERY 6 HOURS PRN
Status: DISCONTINUED | OUTPATIENT
Start: 2023-03-26 | End: 2023-03-28 | Stop reason: HOSPADM

## 2023-03-26 RX ADMIN — MONTELUKAST 10 MG: 10 TABLET, FILM COATED ORAL at 21:52

## 2023-03-26 RX ADMIN — SOTALOL HYDROCHLORIDE 80 MG: 80 TABLET ORAL at 21:52

## 2023-03-26 RX ADMIN — SODIUM CHLORIDE 5 MG/HR: 900 INJECTION, SOLUTION INTRAVENOUS at 19:16

## 2023-03-26 RX ADMIN — DILTIAZEM HYDROCHLORIDE 20 MG: 5 INJECTION INTRAVENOUS at 17:49

## 2023-03-26 RX ADMIN — SODIUM CHLORIDE 1000 ML: 9 INJECTION, SOLUTION INTRAVENOUS at 17:46

## 2023-03-26 RX ADMIN — APIXABAN 5 MG: 5 TABLET, FILM COATED ORAL at 21:52

## 2023-03-26 RX ADMIN — SODIUM CHLORIDE 15 ML/HR: 9 INJECTION, SOLUTION INTRAVENOUS at 21:51

## 2023-03-26 ASSESSMENT — ENCOUNTER SYMPTOMS
ABDOMINAL DISTENTION: 0
COUGH: 0
CHEST TIGHTNESS: 0
VOMITING: 0
WHEEZING: 0
COLOR CHANGE: 0
SHORTNESS OF BREATH: 1
NAUSEA: 0
CONSTIPATION: 0
ABDOMINAL PAIN: 0

## 2023-03-26 ASSESSMENT — PAIN - FUNCTIONAL ASSESSMENT: PAIN_FUNCTIONAL_ASSESSMENT: NONE - DENIES PAIN

## 2023-03-27 LAB
ANION GAP SERPL CALCULATED.3IONS-SCNC: 10 MMOL/L (ref 7–19)
BUN SERPL-MCNC: 12 MG/DL (ref 8–23)
CALCIUM SERPL-MCNC: 9.1 MG/DL (ref 8.8–10.2)
CHLORIDE SERPL-SCNC: 113 MMOL/L (ref 98–111)
CHOLEST SERPL-MCNC: 171 MG/DL (ref 160–199)
CO2 SERPL-SCNC: 23 MMOL/L (ref 22–29)
CREAT SERPL-MCNC: 0.7 MG/DL (ref 0.5–1.2)
ERYTHROCYTE [DISTWIDTH] IN BLOOD BY AUTOMATED COUNT: 14.7 % (ref 11.5–14.5)
GLUCOSE SERPL-MCNC: 102 MG/DL (ref 74–109)
HCT VFR BLD AUTO: 43.9 % (ref 42–52)
HDLC SERPL-MCNC: 28 MG/DL (ref 55–121)
HGB BLD-MCNC: 15 G/DL (ref 14–18)
LDLC SERPL CALC-MCNC: 107 MG/DL
LV EF: 60 %
LVEF MODALITY: NORMAL
MAGNESIUM SERPL-MCNC: 2 MG/DL (ref 1.6–2.4)
MCH RBC QN AUTO: 30.7 PG (ref 27–31)
MCHC RBC AUTO-ENTMCNC: 34.2 G/DL (ref 33–37)
MCV RBC AUTO: 89.8 FL (ref 80–94)
PLATELET # BLD AUTO: 268 K/UL (ref 130–400)
PMV BLD AUTO: 9.6 FL (ref 9.4–12.4)
POTASSIUM SERPL-SCNC: 3.7 MMOL/L (ref 3.5–5)
RBC # BLD AUTO: 4.89 M/UL (ref 4.7–6.1)
SODIUM SERPL-SCNC: 146 MMOL/L (ref 136–145)
TRIGL SERPL-MCNC: 181 MG/DL (ref 0–149)
TROPONIN T SERPL-MCNC: <0.01 NG/ML (ref 0–0.03)
TROPONIN T SERPL-MCNC: <0.01 NG/ML (ref 0–0.03)
WBC # BLD AUTO: 6.9 K/UL (ref 4.8–10.8)

## 2023-03-27 PROCEDURE — G0378 HOSPITAL OBSERVATION PER HR: HCPCS

## 2023-03-27 PROCEDURE — 96361 HYDRATE IV INFUSION ADD-ON: CPT

## 2023-03-27 PROCEDURE — 85027 COMPLETE CBC AUTOMATED: CPT

## 2023-03-27 PROCEDURE — 6370000000 HC RX 637 (ALT 250 FOR IP): Performed by: INTERNAL MEDICINE

## 2023-03-27 PROCEDURE — 84484 ASSAY OF TROPONIN QUANT: CPT

## 2023-03-27 PROCEDURE — 80061 LIPID PANEL: CPT

## 2023-03-27 PROCEDURE — 6360000004 HC RX CONTRAST MEDICATION: Performed by: HOSPITALIST

## 2023-03-27 PROCEDURE — 2580000003 HC RX 258: Performed by: HOSPITALIST

## 2023-03-27 PROCEDURE — 2500000003 HC RX 250 WO HCPCS: Performed by: PHYSICIAN ASSISTANT

## 2023-03-27 PROCEDURE — 6370000000 HC RX 637 (ALT 250 FOR IP): Performed by: HOSPITALIST

## 2023-03-27 PROCEDURE — 94760 N-INVAS EAR/PLS OXIMETRY 1: CPT

## 2023-03-27 PROCEDURE — 96365 THER/PROPH/DIAG IV INF INIT: CPT

## 2023-03-27 PROCEDURE — 99215 OFFICE O/P EST HI 40 MIN: CPT | Performed by: INTERNAL MEDICINE

## 2023-03-27 PROCEDURE — 83735 ASSAY OF MAGNESIUM: CPT

## 2023-03-27 PROCEDURE — 80048 BASIC METABOLIC PNL TOTAL CA: CPT

## 2023-03-27 PROCEDURE — 36415 COLL VENOUS BLD VENIPUNCTURE: CPT

## 2023-03-27 PROCEDURE — 2580000003 HC RX 258: Performed by: PHYSICIAN ASSISTANT

## 2023-03-27 PROCEDURE — C8929 TTE W OR WO FOL WCON,DOPPLER: HCPCS

## 2023-03-27 RX ORDER — APIXABAN 5 MG/1
TABLET, FILM COATED ORAL
Qty: 60 TABLET | Refills: 0 | OUTPATIENT
Start: 2023-03-27

## 2023-03-27 RX ORDER — LOSARTAN POTASSIUM 25 MG/1
50 TABLET ORAL DAILY
Status: DISCONTINUED | OUTPATIENT
Start: 2023-03-27 | End: 2023-03-28 | Stop reason: HOSPADM

## 2023-03-27 RX ADMIN — SOTALOL HYDROCHLORIDE 80 MG: 80 TABLET ORAL at 08:53

## 2023-03-27 RX ADMIN — ASPIRIN 81 MG: 81 TABLET, COATED ORAL at 09:07

## 2023-03-27 RX ADMIN — MONTELUKAST 10 MG: 10 TABLET, FILM COATED ORAL at 21:55

## 2023-03-27 RX ADMIN — DILTIAZEM HYDROCHLORIDE 30 MG: 30 TABLET, FILM COATED ORAL at 18:07

## 2023-03-27 RX ADMIN — FENOFIBRATE 54 MG: 54 TABLET ORAL at 08:53

## 2023-03-27 RX ADMIN — ALLOPURINOL 100 MG: 100 TABLET ORAL at 08:53

## 2023-03-27 RX ADMIN — NIFEDIPINE 90 MG: 90 TABLET, FILM COATED, EXTENDED RELEASE ORAL at 08:53

## 2023-03-27 RX ADMIN — PANTOPRAZOLE SODIUM 40 MG: 40 TABLET, DELAYED RELEASE ORAL at 06:14

## 2023-03-27 RX ADMIN — ATORVASTATIN CALCIUM 10 MG: 20 TABLET, FILM COATED ORAL at 08:53

## 2023-03-27 RX ADMIN — ERGOCALCIFEROL 50000 UNITS: 1.25 CAPSULE ORAL at 06:14

## 2023-03-27 RX ADMIN — APIXABAN 5 MG: 5 TABLET, FILM COATED ORAL at 08:53

## 2023-03-27 RX ADMIN — SOTALOL HYDROCHLORIDE 80 MG: 80 TABLET ORAL at 21:55

## 2023-03-27 RX ADMIN — LOSARTAN POTASSIUM 50 MG: 25 TABLET, FILM COATED ORAL at 12:02

## 2023-03-27 RX ADMIN — DILTIAZEM HYDROCHLORIDE 30 MG: 30 TABLET, FILM COATED ORAL at 22:02

## 2023-03-27 RX ADMIN — SODIUM CHLORIDE, PRESERVATIVE FREE 20 ML: 5 INJECTION INTRAVENOUS at 21:55

## 2023-03-27 RX ADMIN — SODIUM CHLORIDE 7.5 MG/HR: 900 INJECTION, SOLUTION INTRAVENOUS at 04:38

## 2023-03-27 RX ADMIN — SODIUM CHLORIDE, PRESERVATIVE FREE 10 ML: 5 INJECTION INTRAVENOUS at 08:56

## 2023-03-27 RX ADMIN — APIXABAN 5 MG: 5 TABLET, FILM COATED ORAL at 21:55

## 2023-03-27 RX ADMIN — DILTIAZEM HYDROCHLORIDE 30 MG: 30 TABLET, FILM COATED ORAL at 12:01

## 2023-03-27 RX ADMIN — PERFLUTREN 1.5 ML: 6.52 INJECTION, SUSPENSION INTRAVENOUS at 16:06

## 2023-03-27 ASSESSMENT — ENCOUNTER SYMPTOMS
RESPIRATORY NEGATIVE: 1
EYES NEGATIVE: 1
ALLERGIC/IMMUNOLOGIC NEGATIVE: 1
GASTROINTESTINAL NEGATIVE: 1

## 2023-03-27 NOTE — ED NOTES
juan david strip printed and provided to PA, episode of 5 PVCs in a row noted, pt denies any symptoms with it     Vilma Vela RN  03/26/23 2000

## 2023-03-27 NOTE — PROGRESS NOTES
[facility]      Progress Note    Patient:  Chun Townsend  YOB: 1960  Date of Service: 3/27/2023  MRN: [id number]   Acct: [de-identified]   Primary Care Physician: Lakisha Olivera DO  Advance Directive: Full Code  Admit Date: 3/26/2023       Hospital Day: 0    Portions of this note have been copied forward, however, updated to reflect the most current clinical status of this patient. CHIEF COMPLAINT irregular heartbeat    SUBJECTIVE: No new complaints. Converted to normal sinus      CUMULATIVE HOSPITAL COURSE:   Patient is a 72-year-old with past medical history of coronary artery disease, diabetes, hypertension, obesity and atrial fibs. Patient monitors his heart rate to his Apple Watch and noted that he was in and out of atrial fibs yesterday. He took an extra sotalol and aspirin. He did not return to sinus rhythm he repeated an extra sotalol this morning prior to her hospital.  Patient did have dyspnea with exertion but denied any chest pain. Patient is a  and consumes a large amount of coffee daily. ED eval showed atrial fib rapid ventricular response blood pressure was in the normal range. Lab sodium was 143 BUN/creatinine normal CK 58 BNP 1601 troponins negative x3 TSH 2.8 drug screen negative vitamin D 29.5 hemoglobin 16.5 hematocrit 47.6 platelets 957 urine negative D-dimer negative COVID-negative. Drip was initiated patient was admitted to PCU when he subsequently converted to normal sinus etiology was consulted and started on Cardizem 30 every 6 hours he is to continue his sotalol twice daily she stopped his HCTZ his losartan is to go to 50 mg daily and nifedipine daily Eliquis 5 twice daily. Echo with preserved EF of 60%, moderate LVH and mild thickening of the valves. Review of Systems   Constitutional:  Positive for fatigue. Eyes: Negative. Respiratory: Negative. Cardiovascular: Negative. Gastrointestinal: Negative.     Endocrine:

## 2023-03-27 NOTE — CONSULTS
Historical Provider, MD   omeprazole (PRILOSEC) 40 MG delayed release capsule Take 40 mg by mouth daily    Historical Provider, MD   metFORMIN (GLUCOPHAGE) 500 MG tablet HOLD METFORMIN FOR 48 HOURS AFTER THE CARDIAC CATHETERIZATION  Patient taking differently: 500 mg 2 times daily 9/20/18   Haily Vu MD       Current Meds:   dilTIAZem  30 mg Oral 4 times per day    losartan  50 mg Oral Daily    sodium chloride flush  5-40 mL IntraVENous 2 times per day    allopurinol  100 mg Oral Daily    apixaban  5 mg Oral BID    aspirin  81 mg Oral Daily    atorvastatin  10 mg Oral Daily    fenofibrate  54 mg Oral Daily    montelukast  10 mg Oral Nightly    NIFEdipine  90 mg Oral Daily    pantoprazole  40 mg Oral QAM AC    sotalol  80 mg Oral BID    vitamin D  50,000 Units Oral Weekly       Current Infused Meds:   dilTIAZem Stopped (03/27/23 1298)    sodium chloride Stopped (03/27/23 0615)       Physical Exam:  Vitals:    03/27/23 1201   BP: 119/87   Pulse: 62   Resp:    Temp:    SpO2:        General: not in acute distress. Appearance: Normal appearance. Head: Normocephalic and atraumatic. Nose: Nose normal.     Mouth: Mucous membranes are moist.   Eyes:      General:         Right eye: No discharge. Left eye: No discharge. Conjunctiva/sclera: Conjunctivae normal.      Pupils: Pupils are equal, round, and reactive to light. Neck:      Thyroid: No thyromegaly. Vascular: Normal carotid pulses. No carotid bruit or JVD. Trachea: Trachea and phonation normal. No tracheal tenderness. Cardiovascular:      Rate and Rhythm: irregular rate and rhythm     Pulses:    2+pulses      Heart sounds: Normal heart sounds. No murmur heard. No friction rub. No gallop. Pulmonary:      Breath sounds: Normal breath sounds. No decreased breath sounds, wheezing, rhonchi or rales. Abdominal:      General: Bowel sounds are normal. There is no distension. Tenderness:  There is no abdominal

## 2023-03-27 NOTE — H&P
This office note has been dictated.     Penicillins    Social History:    The patient currently lives home with spouse  Tobacco:   reports that he has never smoked. He has never used smokeless tobacco.  Alcohol:   reports no history of alcohol use. Illicit Drugs: denies    Family History:      Problem Relation Age of Onset    Cancer Mother     Coronary Art Dis Father     Heart Attack Father     Heart Disease Father     High Blood Pressure Father     High Cholesterol Father     No Known Problems Sister     No Known Problems Brother     No Known Problems Brother        Review of Systems:   Review of Systems   Constitutional:  Negative for chills, diaphoresis, fatigue and fever. Respiratory:  Positive for shortness of breath. Negative for cough, chest tightness and wheezing. Cardiovascular:  Negative for chest pain and palpitations. Gastrointestinal:  Negative for abdominal distention, abdominal pain, constipation, nausea and vomiting. Skin:  Negative for color change, pallor and rash. Neurological:  Negative for tremors, syncope, weakness, light-headedness, numbness and headaches. Psychiatric/Behavioral:  Negative for agitation, behavioral problems and confusion. Physical Examination:  /77   Pulse (!) 106   Temp 98 °F (36.7 °C)   Resp 26   Ht 5' 9\" (1.753 m)   Wt 284 lb (128.8 kg)   SpO2 94%   BMI 41.94 kg/m²   Physical Exam  Vitals and nursing note reviewed. Constitutional:       Appearance: Normal appearance. He is obese. He is not ill-appearing. HENT:      Mouth/Throat:      Mouth: Mucous membranes are moist.      Pharynx: Oropharynx is clear. Eyes:      Extraocular Movements: Extraocular movements intact. Conjunctiva/sclera: Conjunctivae normal.      Pupils: Pupils are equal, round, and reactive to light. Cardiovascular:      Rate and Rhythm: Tachycardia present. Rhythm irregular. Pulses: Normal pulses. Heart sounds: Normal heart sounds. No murmur heard.   Pulmonary:      Effort: Pulmonary

## 2023-03-27 NOTE — ED PROVIDER NOTES
respiratory distress. Breath sounds: Normal breath sounds. Chest:      Chest wall: No tenderness. Abdominal:      General: There is no distension. Palpations: Abdomen is soft. Tenderness: There is no abdominal tenderness. Musculoskeletal:      Cervical back: Normal range of motion and neck supple. No rigidity or tenderness. Right lower leg: No edema. Left lower leg: No edema. Lymphadenopathy:      Cervical: No cervical adenopathy. Skin:     General: Skin is warm and dry. Neurological:      General: No focal deficit present. Mental Status: He is alert and oriented to person, place, and time. DIAGNOSTIC RESULTS     EKG: All EKG's areinterpreted by the Emergency Department Physician who either signs or Co-signs this chart in the absence of a cardiologist.    EKG interpreted by attending, Dr. Yifan Saleem, atrial fibrillation with RVR at a rate of 145, borderline prolonged QT interval with QTc 490    RADIOLOGY:  Non-plain film images such as CT, Ultrasound and MRI are read by the radiologist. Plain radiographic images are visualized and preliminarily interpreted bythe emergency physician with the below findings:    XR CHEST PORTABLE   Final Result   No radiographic evidence of acute cardiopulmonary process. LABS:  Labs Reviewed   CBC WITH AUTO DIFFERENTIAL - Abnormal; Notable for the following components:       Result Value    MCH 31.1 (*)     RDW 14.7 (*)     Neutrophils % 49.3 (*)     Monocytes % 10.1 (*)     Basophils % 1.1 (*)     All other components within normal limits   COVID-19, RAPID   COMPREHENSIVE METABOLIC PANEL   TROPONIN   MAGNESIUM   TSH   VITAMIN D 25 HYDROXY   URINALYSIS WITH REFLEX TO CULTURE   URINE DRUG SCREEN   CBC   BASIC METABOLIC PANEL   BRAIN NATRIURETIC PEPTIDE   D-DIMER, QUANTITATIVE   MAGNESIUM       All other labs were within normal range or not returned as of this dictation.     EMERGENCY DEPARTMENT COURSE and DIFFERENTIAL DIAGNOSIS/MDM:

## 2023-03-27 NOTE — ED NOTES
Pt rhythm strip showed another repisode of V-Tach. RN at bedside, pt A&O and denies pain but reports episode of dizziness. x7 PVCs noted on rhythm strip, Delia CRUZ notified, no further orders received at this time.   Strip printed for records     [place], [place]  03/26/23 2110

## 2023-03-28 VITALS
RESPIRATION RATE: 16 BRPM | BODY MASS INDEX: 42.6 KG/M2 | HEIGHT: 69 IN | HEART RATE: 69 BPM | WEIGHT: 287.6 LBS | DIASTOLIC BLOOD PRESSURE: 78 MMHG | OXYGEN SATURATION: 96 % | TEMPERATURE: 97.9 F | SYSTOLIC BLOOD PRESSURE: 111 MMHG

## 2023-03-28 PROBLEM — I71.40 AAA (ABDOMINAL AORTIC ANEURYSM) (HCC): Status: ACTIVE | Noted: 2023-03-28

## 2023-03-28 LAB
ANION GAP SERPL CALCULATED.3IONS-SCNC: 11 MMOL/L (ref 7–19)
BUN SERPL-MCNC: 11 MG/DL (ref 8–23)
CALCIUM SERPL-MCNC: 8.8 MG/DL (ref 8.8–10.2)
CHLORIDE SERPL-SCNC: 110 MMOL/L (ref 98–111)
CO2 SERPL-SCNC: 21 MMOL/L (ref 22–29)
CREAT SERPL-MCNC: 0.9 MG/DL (ref 0.5–1.2)
ERYTHROCYTE [DISTWIDTH] IN BLOOD BY AUTOMATED COUNT: 14.5 % (ref 11.5–14.5)
GLUCOSE SERPL-MCNC: 117 MG/DL (ref 74–109)
HCT VFR BLD AUTO: 43.6 % (ref 42–52)
HGB BLD-MCNC: 15.2 G/DL (ref 14–18)
MAGNESIUM SERPL-MCNC: 2.1 MG/DL (ref 1.6–2.4)
MCH RBC QN AUTO: 31.3 PG (ref 27–31)
MCHC RBC AUTO-ENTMCNC: 34.9 G/DL (ref 33–37)
MCV RBC AUTO: 89.9 FL (ref 80–94)
PLATELET # BLD AUTO: 266 K/UL (ref 130–400)
PMV BLD AUTO: 9.8 FL (ref 9.4–12.4)
POTASSIUM SERPL-SCNC: 3.7 MMOL/L (ref 3.5–5)
RBC # BLD AUTO: 4.85 M/UL (ref 4.7–6.1)
SODIUM SERPL-SCNC: 142 MMOL/L (ref 136–145)
WBC # BLD AUTO: 7.1 K/UL (ref 4.8–10.8)

## 2023-03-28 PROCEDURE — 80048 BASIC METABOLIC PNL TOTAL CA: CPT

## 2023-03-28 PROCEDURE — 6370000000 HC RX 637 (ALT 250 FOR IP): Performed by: INTERNAL MEDICINE

## 2023-03-28 PROCEDURE — 85027 COMPLETE CBC AUTOMATED: CPT

## 2023-03-28 PROCEDURE — 83735 ASSAY OF MAGNESIUM: CPT

## 2023-03-28 PROCEDURE — 36415 COLL VENOUS BLD VENIPUNCTURE: CPT

## 2023-03-28 PROCEDURE — G0378 HOSPITAL OBSERVATION PER HR: HCPCS

## 2023-03-28 PROCEDURE — 6370000000 HC RX 637 (ALT 250 FOR IP): Performed by: HOSPITALIST

## 2023-03-28 RX ORDER — ERGOCALCIFEROL 1.25 MG/1
50000 CAPSULE ORAL WEEKLY
Qty: 5 CAPSULE | Refills: 0 | Status: SHIPPED | OUTPATIENT
Start: 2023-04-02 | End: 2023-05-02

## 2023-03-28 RX ORDER — ALLOPURINOL 100 MG/1
100 TABLET ORAL DAILY
Qty: 30 TABLET | Refills: 3 | Status: SHIPPED | OUTPATIENT
Start: 2023-03-28

## 2023-03-28 RX ADMIN — PANTOPRAZOLE SODIUM 40 MG: 40 TABLET, DELAYED RELEASE ORAL at 07:45

## 2023-03-28 RX ADMIN — SOTALOL HYDROCHLORIDE 80 MG: 80 TABLET ORAL at 09:22

## 2023-03-28 RX ADMIN — APIXABAN 5 MG: 5 TABLET, FILM COATED ORAL at 09:21

## 2023-03-28 RX ADMIN — DILTIAZEM HYDROCHLORIDE 30 MG: 30 TABLET, FILM COATED ORAL at 07:40

## 2023-03-28 RX ADMIN — ATORVASTATIN CALCIUM 10 MG: 20 TABLET, FILM COATED ORAL at 09:21

## 2023-03-28 RX ADMIN — LOSARTAN POTASSIUM 50 MG: 25 TABLET, FILM COATED ORAL at 09:21

## 2023-03-28 RX ADMIN — ALLOPURINOL 100 MG: 100 TABLET ORAL at 09:22

## 2023-03-28 RX ADMIN — FENOFIBRATE 54 MG: 54 TABLET ORAL at 09:21

## 2023-03-28 RX ADMIN — ASPIRIN 81 MG: 81 TABLET, COATED ORAL at 09:22

## 2023-03-28 NOTE — DISCHARGE SUMMARY
Jaspreet Damasoin [phone] [place] Payton [phone]  [address] [facility]      Phone: [phone]   allopurinol 100 MG tablet  dilTIAZem 30 MG tablet  Vitamin D (Ergocalciferol) 48402 units Caps           Discharge Instructions: Follow up with Elicia Piper DO in 3-5 days. Take medications as directed. Resume activity as tolerated. Recommended Follow Up:  ANTHONY Templeton NP 55  [address]  [phone]    Follow up on 4/24/2023  9:45 am Cardiology LazarabrandanDO joseph  [address]  [phone]    Follow up on 4/10/2023  Appointment time is at 8:00 AM, Hospital Follow Up      Followup Appointments Scheduled at Time of Discharge:  Future Appointments   Date Time Provider Getachew Moore   4/24/2023  9:45 AM ANTHONY Templeton NP N [facility]   9/27/2023  9:00 AM MD JOSEPH Whitaker [facility]          Diet: No diet orders on file        DISCHARGE STATUS:    Condition on Discharge: stable    Disposition: Patient is medically stable and will be discharged Home      Extended Emergency Contact Information  Primary Emergency Contact: Brittany Tafoya  Address: [address], [address] [place] [address],[address] Phone: [phone]  Mobile Phone: [phone]  Relation: Spouse         Time Spent on discharge is  30 minutes in the examination, evaluation, counseling and review of medications and discharge plan. Electronically signed by   ANTHONY Momin CNP,   Internal Medicine Hospitalist   3/28/2023 5:08 PM      Thank you Elciia Piper DO for the opportunity to be involved in this patient's care. If you have any questions or concerns please feel free to contact me at [phone]        EMR Dragon/Transcription disclaimer:   Much of this encounter note is an electronic transcription/translation of spoken language to printed text.  The electronic translation of spoken language may

## 2023-04-03 LAB
EKG P AXIS: NORMAL DEGREES
EKG P-R INTERVAL: NORMAL MS
EKG Q-T INTERVAL: 314 MS
EKG QRS DURATION: 90 MS
EKG QTC CALCULATION (BAZETT): 466 MS
EKG T AXIS: 13 DEGREES

## 2023-04-04 RX ORDER — APIXABAN 5 MG/1
TABLET, FILM COATED ORAL
Qty: 60 TABLET | Refills: 3 | Status: SHIPPED | OUTPATIENT
Start: 2023-04-04

## 2023-04-07 RX ORDER — DILTIAZEM HYDROCHLORIDE 180 MG/1
180 CAPSULE, COATED, EXTENDED RELEASE ORAL 2 TIMES DAILY
Qty: 60 CAPSULE | Refills: 5 | Status: SHIPPED | OUTPATIENT
Start: 2023-04-07

## 2023-04-24 ENCOUNTER — OFFICE VISIT (OUTPATIENT)
Dept: CARDIOLOGY CLINIC | Age: 63
End: 2023-04-24
Payer: COMMERCIAL

## 2023-04-24 ENCOUNTER — TELEPHONE (OUTPATIENT)
Dept: CARDIOLOGY CLINIC | Age: 63
End: 2023-04-24

## 2023-04-24 VITALS
DIASTOLIC BLOOD PRESSURE: 82 MMHG | BODY MASS INDEX: 42.36 KG/M2 | SYSTOLIC BLOOD PRESSURE: 134 MMHG | HEIGHT: 69 IN | OXYGEN SATURATION: 99 % | WEIGHT: 286 LBS | HEART RATE: 122 BPM

## 2023-04-24 DIAGNOSIS — I10 ESSENTIAL HYPERTENSION: ICD-10-CM

## 2023-04-24 DIAGNOSIS — I48.0 PAF (PAROXYSMAL ATRIAL FIBRILLATION) (HCC): Primary | ICD-10-CM

## 2023-04-24 DIAGNOSIS — E78.5 DYSLIPIDEMIA: ICD-10-CM

## 2023-04-24 PROCEDURE — 99214 OFFICE O/P EST MOD 30 MIN: CPT | Performed by: NURSE PRACTITIONER

## 2023-04-24 PROCEDURE — 3075F SYST BP GE 130 - 139MM HG: CPT | Performed by: NURSE PRACTITIONER

## 2023-04-24 PROCEDURE — 3079F DIAST BP 80-89 MM HG: CPT | Performed by: NURSE PRACTITIONER

## 2023-04-24 PROCEDURE — 93000 ELECTROCARDIOGRAM COMPLETE: CPT | Performed by: NURSE PRACTITIONER

## 2023-04-24 ASSESSMENT — ENCOUNTER SYMPTOMS
CHEST TIGHTNESS: 0
SORE THROAT: 0
WHEEZING: 0
COUGH: 0
SHORTNESS OF BREATH: 1

## 2023-04-24 NOTE — TELEPHONE ENCOUNTER
Spoke with patient in office, have DCCV w/Anesthesia scheduled for 4/27/23 with a tentative time of 1:30pm with arrival of 11:30am.  Advised we will contact patient if time/date changes. Patient is to be NPO after midnight. Patient instructed to arrive through front entrance of hospital and make immediate left. Patient advised may take morning medications with sip of water. Patient does not have IV dye allergy. Patient verbally understood. Patient was instructed to not miss any doses of his Eliquis from now to his procedure. He voiced understanding.

## 2023-04-24 NOTE — PROGRESS NOTES
[facility] Cardiology   Established Patient Office Visit   [facility]. [address] [facility]  [phone]        OFFICE VISIT:  2023    Nadia Hernandez - : 1960    Reason For Visit:  Nakul Perdomo is a 58 y.o. male who is here for Follow-Up from Hospital    1. PAF (paroxysmal atrial fibrillation) ([address]    2. Essential hypertension    3. Dyslipidemia      Patient with history of paroxysmal atrial fib diagnosed in May 2019 on Betapace and Eliquis. Coronary artery disease in May 2019 showed a proximal LAD 35%, mid LAD diffuse long intramyocardial 60 to 70% stenosis with likely bridging, left dominant, EF 50%. Diabetes. Hypertension. And obesity. Patient of Dr. Bonifacio Simpson. Patient presented to the ER on 3/26/2023 with his Apple Watch noting that he was in and out of A-fib. He took an extra sotalol and aspirin. He did not return to sinus rhythm but continued to repeat an extra sotalol the next day. While in the emergency department telemetry showed atrial fib with rapid ventricular response. Patient was started on a Cardizem drip and admitted. He did convert to normal sinus rhythm and started on oral Cardizem 30 mg every 6 hours and to continue with the sotalol twice a day. Did stop his hydrochlorothiazide. Did decrease his losartan to 50 mg daily. And continue with his Eliquis twice daily. 2D echo showed an EF of 60%, moderate LVH, and mild thickening of the valves. Patient presents to clinic today for follow-up. Patient accompanied by wife. Patient states he remained in sinus rhythm for about 3 weeks post discharge. He is now noting more episodes of going back into the atrial fib. He has taken an extra sotalol with some improvement. However now he feels like he is in constant A-fib. He has not missed an Eliquis dose for well over 2 years. He is very tired and fatigued with the atrial fib. He denies any chest pain.       Subjective    Nadia Hernandez is a 58 y.o. male with the

## 2023-04-24 NOTE — TELEPHONE ENCOUNTER
----- Message from ANTHONY Rao NP sent at 4/24/2023  1:02 PM CDT -----  Please let patient know I have just spoken with Dr. Dhara Wiley. He is in agreement to do the cardioversion on Thursday. Keep taking sotalol at current dose. And he would like to talk to patient about possible referral to EP  For ablation when he sees him on Thursday.

## 2023-04-24 NOTE — PATIENT INSTRUCTIONS
[place] at the [facility] and [address] located on the first floor of [facility].       Patient's contact number:  [phone] (home)     Date/Time:     Order:  BMP  (Basic metabolic profile). If on Coumadin patient will need a PT/INR. Out-Patient -  Cardioversion    Cardioversion is a procedure that uses an electrical current to help normalize the heart rhythm. You will be coming into our facility as an outpatient and will go home following this procedure. Cardioversion Instructions:  Do not eat or drink anything after midnight the night before your procedure. May take morning medications with a sip of water unless otherwise directed not to. You should arrange to have someone take you home rather than drive yourself. Further plans will depend upon the result of your procedure. If for any reason you are unable to keep this appointment, please contact [facility], [phone], as soon as possible to reschedule.

## 2023-04-27 ENCOUNTER — HOSPITAL ENCOUNTER (OUTPATIENT)
Dept: CARDIAC CATH/INVASIVE PROCEDURES | Age: 63
Setting detail: OBSERVATION
Discharge: HOME | End: 2023-04-29
Attending: INTERNAL MEDICINE | Admitting: INTERNAL MEDICINE
Payer: COMMERCIAL

## 2023-04-27 PROBLEM — I48.91 A-FIB (HCC): Status: ACTIVE | Noted: 2023-04-27

## 2023-04-27 LAB
ANION GAP SERPL CALCULATED.3IONS-SCNC: 13 MMOL/L (ref 7–19)
BUN SERPL-MCNC: 19 MG/DL (ref 8–23)
CALCIUM SERPL-MCNC: 9.8 MG/DL (ref 8.8–10.2)
CHLORIDE SERPL-SCNC: 106 MMOL/L (ref 98–111)
CO2 SERPL-SCNC: 23 MMOL/L (ref 22–29)
CREAT SERPL-MCNC: 1.1 MG/DL (ref 0.5–1.2)
ERYTHROCYTE [DISTWIDTH] IN BLOOD BY AUTOMATED COUNT: 14.4 % (ref 11.5–14.5)
GLUCOSE BLD-MCNC: 111 MG/DL (ref 70–99)
GLUCOSE BLD-MCNC: 143 MG/DL (ref 70–99)
GLUCOSE SERPL-MCNC: 101 MG/DL (ref 74–109)
HCT VFR BLD AUTO: 47 % (ref 42–52)
HGB BLD-MCNC: 16 G/DL (ref 14–18)
MCH RBC QN AUTO: 31.1 PG (ref 27–31)
MCHC RBC AUTO-ENTMCNC: 34 G/DL (ref 33–37)
MCV RBC AUTO: 91.3 FL (ref 80–94)
PERFORMED ON: ABNORMAL
PERFORMED ON: ABNORMAL
PLATELET # BLD AUTO: 322 K/UL (ref 130–400)
PMV BLD AUTO: 9.6 FL (ref 9.4–12.4)
POTASSIUM SERPL-SCNC: 4 MMOL/L (ref 3.5–5)
RBC # BLD AUTO: 5.15 M/UL (ref 4.7–6.1)
SODIUM SERPL-SCNC: 142 MMOL/L (ref 136–145)
WBC # BLD AUTO: 8.4 K/UL (ref 4.8–10.8)

## 2023-04-27 PROCEDURE — 85027 COMPLETE CBC AUTOMATED: CPT

## 2023-04-27 PROCEDURE — 82962 GLUCOSE BLOOD TEST: CPT

## 2023-04-27 PROCEDURE — 6370000000 HC RX 637 (ALT 250 FOR IP): Performed by: INTERNAL MEDICINE

## 2023-04-27 PROCEDURE — 80048 BASIC METABOLIC PNL TOTAL CA: CPT

## 2023-04-27 PROCEDURE — 2580000003 HC RX 258: Performed by: INTERNAL MEDICINE

## 2023-04-27 PROCEDURE — 99222 1ST HOSP IP/OBS MODERATE 55: CPT | Performed by: INTERNAL MEDICINE

## 2023-04-27 PROCEDURE — 93005 ELECTROCARDIOGRAM TRACING: CPT | Performed by: INTERNAL MEDICINE

## 2023-04-27 PROCEDURE — G0378 HOSPITAL OBSERVATION PER HR: HCPCS

## 2023-04-27 PROCEDURE — 36415 COLL VENOUS BLD VENIPUNCTURE: CPT

## 2023-04-27 RX ORDER — M-VIT,TX,IRON,MINS/CALC/FOLIC 27MG-0.4MG
1 TABLET ORAL DAILY
Status: DISCONTINUED | OUTPATIENT
Start: 2023-04-28 | End: 2023-04-29 | Stop reason: HOSPADM

## 2023-04-27 RX ORDER — LOSARTAN POTASSIUM 50 MG/1
50 TABLET ORAL 2 TIMES DAILY
Status: DISCONTINUED | OUTPATIENT
Start: 2023-04-27 | End: 2023-04-29 | Stop reason: HOSPADM

## 2023-04-27 RX ORDER — ACETAMINOPHEN 325 MG/1
650 TABLET ORAL EVERY 6 HOURS PRN
Status: DISCONTINUED | OUTPATIENT
Start: 2023-04-27 | End: 2023-04-29 | Stop reason: HOSPADM

## 2023-04-27 RX ORDER — SODIUM CHLORIDE 9 MG/ML
INJECTION, SOLUTION INTRAVENOUS PRN
Status: DISCONTINUED | OUTPATIENT
Start: 2023-04-27 | End: 2023-04-29 | Stop reason: HOSPADM

## 2023-04-27 RX ORDER — SODIUM CHLORIDE 0.9 % (FLUSH) 0.9 %
5-40 SYRINGE (ML) INJECTION EVERY 12 HOURS SCHEDULED
Status: DISCONTINUED | OUTPATIENT
Start: 2023-04-27 | End: 2023-04-29 | Stop reason: HOSPADM

## 2023-04-27 RX ORDER — ATORVASTATIN CALCIUM 10 MG/1
10 TABLET, FILM COATED ORAL DAILY
Status: DISCONTINUED | OUTPATIENT
Start: 2023-04-28 | End: 2023-04-29 | Stop reason: HOSPADM

## 2023-04-27 RX ORDER — ACETAMINOPHEN 650 MG/1
650 SUPPOSITORY RECTAL EVERY 6 HOURS PRN
Status: DISCONTINUED | OUTPATIENT
Start: 2023-04-27 | End: 2023-04-29 | Stop reason: HOSPADM

## 2023-04-27 RX ORDER — SOTALOL HYDROCHLORIDE 80 MG/1
80 TABLET ORAL ONCE
Status: COMPLETED | OUTPATIENT
Start: 2023-04-27 | End: 2023-04-27

## 2023-04-27 RX ORDER — ALLOPURINOL 100 MG/1
100 TABLET ORAL DAILY
Status: DISCONTINUED | OUTPATIENT
Start: 2023-04-28 | End: 2023-04-29 | Stop reason: HOSPADM

## 2023-04-27 RX ORDER — ASPIRIN 81 MG/1
81 TABLET, CHEWABLE ORAL DAILY
Status: DISCONTINUED | OUTPATIENT
Start: 2023-04-27 | End: 2023-04-27 | Stop reason: SDUPTHER

## 2023-04-27 RX ORDER — FENOFIBRATE 160 MG/1
160 TABLET ORAL DAILY
Status: DISCONTINUED | OUTPATIENT
Start: 2023-04-28 | End: 2023-04-29 | Stop reason: HOSPADM

## 2023-04-27 RX ORDER — DILTIAZEM HYDROCHLORIDE 180 MG/1
180 CAPSULE, COATED, EXTENDED RELEASE ORAL 2 TIMES DAILY
Status: DISCONTINUED | OUTPATIENT
Start: 2023-04-27 | End: 2023-04-29 | Stop reason: HOSPADM

## 2023-04-27 RX ORDER — SODIUM CHLORIDE 0.9 % (FLUSH) 0.9 %
5-40 SYRINGE (ML) INJECTION PRN
Status: DISCONTINUED | OUTPATIENT
Start: 2023-04-27 | End: 2023-04-29 | Stop reason: HOSPADM

## 2023-04-27 RX ORDER — PANTOPRAZOLE SODIUM 40 MG/1
40 TABLET, DELAYED RELEASE ORAL
Status: DISCONTINUED | OUTPATIENT
Start: 2023-04-28 | End: 2023-04-29 | Stop reason: HOSPADM

## 2023-04-27 RX ORDER — MONTELUKAST SODIUM 10 MG/1
10 TABLET ORAL NIGHTLY
Status: DISCONTINUED | OUTPATIENT
Start: 2023-04-27 | End: 2023-04-29 | Stop reason: HOSPADM

## 2023-04-27 RX ORDER — SOTALOL HYDROCHLORIDE 120 MG/1
120 TABLET ORAL 2 TIMES DAILY
Status: DISCONTINUED | OUTPATIENT
Start: 2023-04-27 | End: 2023-04-28

## 2023-04-27 RX ORDER — ASPIRIN 81 MG/1
81 TABLET ORAL DAILY
Status: DISCONTINUED | OUTPATIENT
Start: 2023-04-28 | End: 2023-04-29 | Stop reason: HOSPADM

## 2023-04-27 RX ORDER — POLYETHYLENE GLYCOL 3350 17 G/17G
17 POWDER, FOR SOLUTION ORAL DAILY PRN
Status: DISCONTINUED | OUTPATIENT
Start: 2023-04-27 | End: 2023-04-29 | Stop reason: HOSPADM

## 2023-04-27 RX ADMIN — APIXABAN 5 MG: 5 TABLET, FILM COATED ORAL at 21:58

## 2023-04-27 RX ADMIN — DILTIAZEM HYDROCHLORIDE 180 MG: 180 CAPSULE, COATED, EXTENDED RELEASE ORAL at 21:58

## 2023-04-27 RX ADMIN — SOTALOL HYDROCHLORIDE 80 MG: 80 TABLET ORAL at 14:38

## 2023-04-27 RX ADMIN — SODIUM CHLORIDE, PRESERVATIVE FREE 10 ML: 5 INJECTION INTRAVENOUS at 21:58

## 2023-04-27 RX ADMIN — MONTELUKAST 10 MG: 10 TABLET, FILM COATED ORAL at 21:58

## 2023-04-27 RX ADMIN — METFORMIN HYDROCHLORIDE 500 MG: 500 TABLET ORAL at 21:58

## 2023-04-27 RX ADMIN — SOTALOL HYDROCHLORIDE 120 MG: 120 TABLET ORAL at 21:58

## 2023-04-27 RX ADMIN — LOSARTAN POTASSIUM 50 MG: 50 TABLET, FILM COATED ORAL at 21:58

## 2023-04-27 ASSESSMENT — ENCOUNTER SYMPTOMS
SHORTNESS OF BREATH: 0
DIARRHEA: 0
BACK PAIN: 0
COUGH: 0
ABDOMINAL DISTENTION: 0
ABDOMINAL PAIN: 0
BLOOD IN STOOL: 0
VOMITING: 0
WHEEZING: 0

## 2023-04-27 NOTE — PROGRESS NOTES
4 Eyes Skin Assessment    Charlotte An is being assessed upon: Admission    I agree that Oleg Simeon RN, along with Lesia Mcdonald RN have performed a thorough Head to Toe Skin Assessment on the patient. ALL assessment sites listed below have been assessed. Areas assessed by both nurses:     [x]   Head, Face, and Ears   [x]   Shoulders, Back, and Chest  [x]   Arms, Elbows, and Hands   [x]   Coccyx, Sacrum, and Ischium  [x]   Legs, Feet, and Heels    Does the Patient have Skin Breakdown? No    [place] Prevention initiated: Yes  Wound Care Orders initiated: NA    [facility] nurse consulted for Pressure Injury (Stage 3,4, Unstageable, DTI, NWPT, and Complex wounds) and New or Established Ostomies: NA        Primary Nurse eSignature:  Johann Wolf RN on 4/27/2023 at 5:56 PM      Co-Signer eSignature: Electronically signed by Regi Karimi RN on 4/27/23 at 6:09 PM CDT

## 2023-04-27 NOTE — PROGRESS NOTES
PHARMACY NOTE:  Yamila Herring was ordered Osteo Bi-Flex. As per the [address], herbals and certain dietary supplements will be discontinued.   The herbal or dietary supplement may be continued after discharge from the hospital.    Electronically signed by Anamika Garcia [facility] on 4/27/2023 at 5:28 PM

## 2023-04-27 NOTE — PLAN OF CARE
Problem: Chronic Conditions and Co-morbidities  Goal: Patient's chronic conditions and co-morbidity symptoms are monitored and maintained or improved  Outcome: Progressing     Problem: Discharge Planning  Goal: Discharge to home or other facility with appropriate resources  Outcome: Progressing     Problem: ABCDS Injury Assessment  Goal: Absence of physical injury  Outcome: Progressing  Flowsheets  Taken 4/27/2023 1719  Absence of Physical Injury: Implement safety measures based on patient assessment  Taken 4/27/2023 1716  Absence of Physical Injury: Implement safety measures based on patient assessment

## 2023-04-27 NOTE — PROGRESS NOTES
Kirk Red arrived to room # 730. Presented with: A Fib  Mental Status: Patient is oriented, alert, coherent, logical, thought processes intact, and able to concentrate and follow conversation. Vitals:    04/27/23 1622   BP: 120/75   Pulse: 69   Resp: 20   Temp: 96.8 °F (36 °C)   SpO2: 96%     Patient safety contract and falls prevention contract reviewed with patient Yes. Oriented Patient and Family to room. Call light within reach. Yes.   Needs, issues or concerns expressed at this time: no.      Electronically signed by Reynaldo Zamora RN on 4/27/2023 at 5:57 PM

## 2023-04-27 NOTE — PROGRESS NOTES
At approx 1155am pt converted from afib to NSR in the 70s. Will obtain EKG and notify  [facility].  Electronically signed by Darryl Sanders RN on 4/27/2023 at 1:18 PM

## 2023-04-27 NOTE — H&P
Patient:  Rick Monreal                  1960  MRN: [id number]    PROBLEM LIST:    Patient Active Problem List    Diagnosis Date Noted    Hypertension      Priority: High    Dyspnea 09/20/2018     Priority: High    AAA (abdominal aortic aneurysm) ([facility] 03/28/2023     Priority: Low    Atrial fibrillation with RVR ([facility] 03/26/2023     Priority: Low    Mild coronary artery disease 07/06/2020     Priority: Low    Morbid obesity with BMI of 40.0-44.9, adult ([facility] 07/06/2020     Priority: Low    Sinoatrial node dysfunction ([facility] 05/03/2019     Priority: Low     Overview Note:     5/2/2019  Converted AFL to NSR on amiodarone and dilt, placed on betapace 80 bid      PAF (paroxysmal atrial fibrillation) ([facility] 04/30/2019     Priority: Low    SOB (shortness of breath) 07/17/2013     Priority: Low     Overview Note:     7/17/2013  SE  negative for myocardial ischemia [facility])  8/18/2018  SE  negative for myocardial ischemia [facility])  9/18/2018  Office visit with Dr. Nicolas Herman, persistent chest pain despite negative stress test, AUC indication 19, AUC score 7   9/20/18  Cath mild CAD, normal LVFX  5/1/19 lexiscan Positive for inferior lateral myocardial ischemia, EF 60%, 4% ischemic myocardium on stress, low risk findings, AUC indication 15, AUC score 4, Sanaz Red MD)   5/3/19  Cath  Mild CAD, normal LVFX           1. Paroxysmal atrial fibrillation, diagnosed 5/2019, on Betapace and Eliquis. 2.  Coronary artery disease, catheterization 5/3/2019 with proximal LAD 35%, mid LAD diffuse long intramyocardial 60 to 70% stenosis with likely bridging, left dominant, ejection fraction 50%. 3.  Non-insulin-dependent diabetes mellitus. 4.  Hypertension. 5.  Severe obesity. PRESENTATION: Rick Monreal is a 58y.o. year old male who presents with with recurrent atrial fibrillation that has been significantly symptomatic with palpitations and fatigue with rapid beating almost to the point that his chest feels sore.   No exertional chest

## 2023-04-27 NOTE — PROGRESS NOTES
[facility]  [facility] discussing plan of care with pt. Orders to give pt betapace 80mg now and to get a PCU bed for pt r/t increasing pt's betapace dose.  Electronically signed by Whitney Delatorre RN on 4/27/2023 at 2:20 PM

## 2023-04-27 NOTE — PROGRESS NOTES
Pt transferred to room 730 via wheelchair with telemetry box. Report was called to [facility]. Pt's wife has pt belongings.  Electronically signed by Chano Shukla RN on 4/27/2023 at 4:21 PM

## 2023-04-28 LAB
ANION GAP SERPL CALCULATED.3IONS-SCNC: 15 MMOL/L (ref 7–19)
BUN SERPL-MCNC: 20 MG/DL (ref 8–23)
CALCIUM SERPL-MCNC: 9.4 MG/DL (ref 8.8–10.2)
CHLORIDE SERPL-SCNC: 105 MMOL/L (ref 98–111)
CO2 SERPL-SCNC: 22 MMOL/L (ref 22–29)
CREAT SERPL-MCNC: 1.1 MG/DL (ref 0.5–1.2)
ERYTHROCYTE [DISTWIDTH] IN BLOOD BY AUTOMATED COUNT: 14 % (ref 11.5–14.5)
GLUCOSE BLD-MCNC: 108 MG/DL (ref 70–99)
GLUCOSE BLD-MCNC: 109 MG/DL (ref 70–99)
GLUCOSE BLD-MCNC: 128 MG/DL (ref 70–99)
GLUCOSE BLD-MCNC: 95 MG/DL (ref 70–99)
GLUCOSE SERPL-MCNC: 96 MG/DL (ref 74–109)
HCT VFR BLD AUTO: 43.7 % (ref 42–52)
HGB BLD-MCNC: 15.1 G/DL (ref 14–18)
MCH RBC QN AUTO: 31.2 PG (ref 27–31)
MCHC RBC AUTO-ENTMCNC: 34.6 G/DL (ref 33–37)
MCV RBC AUTO: 90.3 FL (ref 80–94)
PERFORMED ON: ABNORMAL
PERFORMED ON: NORMAL
PLATELET # BLD AUTO: 272 K/UL (ref 130–400)
PMV BLD AUTO: 10.1 FL (ref 9.4–12.4)
POTASSIUM SERPL-SCNC: 4.1 MMOL/L (ref 3.5–5)
RBC # BLD AUTO: 4.84 M/UL (ref 4.7–6.1)
SODIUM SERPL-SCNC: 142 MMOL/L (ref 136–145)
WBC # BLD AUTO: 7.5 K/UL (ref 4.8–10.8)

## 2023-04-28 PROCEDURE — 99232 SBSQ HOSP IP/OBS MODERATE 35: CPT | Performed by: INTERNAL MEDICINE

## 2023-04-28 PROCEDURE — 82962 GLUCOSE BLOOD TEST: CPT

## 2023-04-28 PROCEDURE — 6370000000 HC RX 637 (ALT 250 FOR IP): Performed by: INTERNAL MEDICINE

## 2023-04-28 PROCEDURE — 36415 COLL VENOUS BLD VENIPUNCTURE: CPT

## 2023-04-28 PROCEDURE — 85027 COMPLETE CBC AUTOMATED: CPT

## 2023-04-28 PROCEDURE — 80048 BASIC METABOLIC PNL TOTAL CA: CPT

## 2023-04-28 PROCEDURE — G0378 HOSPITAL OBSERVATION PER HR: HCPCS

## 2023-04-28 PROCEDURE — 94760 N-INVAS EAR/PLS OXIMETRY 1: CPT

## 2023-04-28 PROCEDURE — 93005 ELECTROCARDIOGRAM TRACING: CPT | Performed by: INTERNAL MEDICINE

## 2023-04-28 PROCEDURE — 2580000003 HC RX 258: Performed by: INTERNAL MEDICINE

## 2023-04-28 RX ORDER — SOTALOL HYDROCHLORIDE 80 MG/1
40 TABLET ORAL ONCE
Status: COMPLETED | OUTPATIENT
Start: 2023-04-28 | End: 2023-04-28

## 2023-04-28 RX ORDER — SOTALOL HYDROCHLORIDE 80 MG/1
160 TABLET ORAL 2 TIMES DAILY
Status: DISCONTINUED | OUTPATIENT
Start: 2023-04-28 | End: 2023-04-29 | Stop reason: HOSPADM

## 2023-04-28 RX ADMIN — APIXABAN 5 MG: 5 TABLET, FILM COATED ORAL at 08:43

## 2023-04-28 RX ADMIN — SODIUM CHLORIDE, PRESERVATIVE FREE 10 ML: 5 INJECTION INTRAVENOUS at 08:43

## 2023-04-28 RX ADMIN — SOTALOL HYDROCHLORIDE 160 MG: 80 TABLET ORAL at 22:10

## 2023-04-28 RX ADMIN — DILTIAZEM HYDROCHLORIDE 180 MG: 180 CAPSULE, COATED, EXTENDED RELEASE ORAL at 22:09

## 2023-04-28 RX ADMIN — ALLOPURINOL 100 MG: 100 TABLET ORAL at 08:43

## 2023-04-28 RX ADMIN — PANTOPRAZOLE SODIUM 40 MG: 40 TABLET, DELAYED RELEASE ORAL at 07:05

## 2023-04-28 RX ADMIN — SOTALOL HYDROCHLORIDE 40 MG: 80 TABLET ORAL at 14:35

## 2023-04-28 RX ADMIN — DILTIAZEM HYDROCHLORIDE 180 MG: 180 CAPSULE, COATED, EXTENDED RELEASE ORAL at 08:43

## 2023-04-28 RX ADMIN — METFORMIN HYDROCHLORIDE 500 MG: 500 TABLET ORAL at 22:10

## 2023-04-28 RX ADMIN — SOTALOL HYDROCHLORIDE 120 MG: 120 TABLET ORAL at 08:43

## 2023-04-28 RX ADMIN — LOSARTAN POTASSIUM 50 MG: 50 TABLET, FILM COATED ORAL at 08:43

## 2023-04-28 RX ADMIN — MONTELUKAST 10 MG: 10 TABLET, FILM COATED ORAL at 22:09

## 2023-04-28 RX ADMIN — MULTIPLE VITAMINS W/ MINERALS TAB 1 TABLET: TAB at 08:43

## 2023-04-28 RX ADMIN — ASPIRIN 81 MG: 81 TABLET, COATED ORAL at 08:43

## 2023-04-28 RX ADMIN — ATORVASTATIN CALCIUM 10 MG: 10 TABLET, FILM COATED ORAL at 08:43

## 2023-04-28 RX ADMIN — METFORMIN HYDROCHLORIDE 500 MG: 500 TABLET ORAL at 08:43

## 2023-04-28 RX ADMIN — FENOFIBRATE 160 MG: 160 TABLET ORAL at 08:43

## 2023-04-28 RX ADMIN — LOSARTAN POTASSIUM 50 MG: 50 TABLET, FILM COATED ORAL at 22:09

## 2023-04-28 RX ADMIN — SODIUM CHLORIDE, PRESERVATIVE FREE 10 ML: 5 INJECTION INTRAVENOUS at 22:10

## 2023-04-28 RX ADMIN — APIXABAN 5 MG: 5 TABLET, FILM COATED ORAL at 22:10

## 2023-04-28 NOTE — PROGRESS NOTES
Cardiology Progress Note Lucio Fonseca MD      Patient:  Saeid Shepard  [id number]    Patient Active Problem List    Diagnosis Date Noted    Hypertension      Priority: High    Dyspnea 09/20/2018     Priority: High    AAA (abdominal aortic aneurysm) ([facility] 03/28/2023     Priority: Low    Atrial fibrillation with RVR ([facility] 03/26/2023     Priority: Low    Mild coronary artery disease 07/06/2020     Priority: Low    Morbid obesity with BMI of 40.0-44.9, adult ([facility] 07/06/2020     Priority: Low    Sinoatrial node dysfunction ([facility] 05/03/2019     Priority: Low     Overview Note:     5/2/2019  Converted AFL to NSR on amiodarone and dilt, placed on betapace 80 bid      PAF (paroxysmal atrial fibrillation) ([facility] 04/30/2019     Priority: Low    SOB (shortness of breath) 07/17/2013     Priority: Low     Overview Note:     7/17/2013  SE  negative for myocardial ischemia [facility])  8/18/2018  SE  negative for myocardial ischemia [facility])  9/18/2018  Office visit with Dr. Micah Yanez, persistent chest pain despite negative stress test, AUC indication 19, AUC score 7   9/20/18  Cath mild CAD, normal LVFX  5/1/19 lexiscan Positive for inferior lateral myocardial ischemia, EF 60%, 4% ischemic myocardium on stress, low risk findings, AUC indication 15, AUC score 4, Gwen Felton MD)   5/3/19  Cath  Mild CAD, normal LVFX          A-fib ([facility] 04/27/2023       Admit Date:  4/27/2023    Admission Problem List: Present on Admission:   A-fib [facility])      Cardiac Specific Data:  Specialty Problems          Cardiology Problems    Hypertension        PAF (paroxysmal atrial fibrillation) (HCC)        Sinoatrial node dysfunction (HCC)        Mild coronary artery disease        Atrial fibrillation with RVR ([facility]        AAA (abdominal aortic aneurysm) ([facility]        * (Principal) A-fib ([facility]         1. Paroxysmal atrial fibrillation, diagnosed 5/2019, on Betapace and Eliquis.   2.  Coronary artery disease, catheterization 5/3/2019 with proximal LAD 35%, mid LAD diffuse

## 2023-04-29 VITALS
WEIGHT: 291.38 LBS | HEART RATE: 62 BPM | TEMPERATURE: 96.9 F | OXYGEN SATURATION: 95 % | RESPIRATION RATE: 18 BRPM | BODY MASS INDEX: 43.16 KG/M2 | HEIGHT: 69 IN | DIASTOLIC BLOOD PRESSURE: 84 MMHG | SYSTOLIC BLOOD PRESSURE: 117 MMHG

## 2023-04-29 LAB
EKG P AXIS: 29 DEGREES
EKG P AXIS: 38 DEGREES
EKG P AXIS: NORMAL DEGREES
EKG P-R INTERVAL: 164 MS
EKG P-R INTERVAL: 178 MS
EKG P-R INTERVAL: NORMAL MS
EKG Q-T INTERVAL: 336 MS
EKG Q-T INTERVAL: 424 MS
EKG Q-T INTERVAL: 450 MS
EKG QRS DURATION: 90 MS
EKG QRS DURATION: 94 MS
EKG QRS DURATION: 98 MS
EKG QTC CALCULATION (BAZETT): 434 MS
EKG QTC CALCULATION (BAZETT): 453 MS
EKG QTC CALCULATION (BAZETT): 465 MS
EKG T AXIS: 57 DEGREES
EKG T AXIS: 57 DEGREES
EKG T AXIS: 61 DEGREES
GLUCOSE BLD-MCNC: 104 MG/DL (ref 70–99)
PERFORMED ON: ABNORMAL

## 2023-04-29 PROCEDURE — 93010 ELECTROCARDIOGRAM REPORT: CPT | Performed by: INTERNAL MEDICINE

## 2023-04-29 PROCEDURE — 99238 HOSP IP/OBS DSCHRG MGMT 30/<: CPT | Performed by: INTERNAL MEDICINE

## 2023-04-29 PROCEDURE — 6370000000 HC RX 637 (ALT 250 FOR IP): Performed by: INTERNAL MEDICINE

## 2023-04-29 PROCEDURE — G0378 HOSPITAL OBSERVATION PER HR: HCPCS

## 2023-04-29 PROCEDURE — 82962 GLUCOSE BLOOD TEST: CPT

## 2023-04-29 PROCEDURE — 93005 ELECTROCARDIOGRAM TRACING: CPT | Performed by: INTERNAL MEDICINE

## 2023-04-29 PROCEDURE — 94760 N-INVAS EAR/PLS OXIMETRY 1: CPT

## 2023-04-29 RX ORDER — SOTALOL HYDROCHLORIDE 160 MG/1
160 TABLET ORAL 2 TIMES DAILY
Qty: 60 TABLET | Refills: 3 | Status: SHIPPED | OUTPATIENT
Start: 2023-04-29

## 2023-04-29 RX ADMIN — SOTALOL HYDROCHLORIDE 160 MG: 80 TABLET ORAL at 09:35

## 2023-04-29 RX ADMIN — LOSARTAN POTASSIUM 50 MG: 50 TABLET, FILM COATED ORAL at 09:35

## 2023-04-29 RX ADMIN — DILTIAZEM HYDROCHLORIDE 180 MG: 180 CAPSULE, COATED, EXTENDED RELEASE ORAL at 09:35

## 2023-04-29 RX ADMIN — ATORVASTATIN CALCIUM 10 MG: 10 TABLET, FILM COATED ORAL at 09:35

## 2023-04-29 RX ADMIN — MULTIPLE VITAMINS W/ MINERALS TAB 1 TABLET: TAB at 09:34

## 2023-04-29 RX ADMIN — METFORMIN HYDROCHLORIDE 500 MG: 500 TABLET ORAL at 09:35

## 2023-04-29 RX ADMIN — FENOFIBRATE 160 MG: 160 TABLET ORAL at 09:35

## 2023-04-29 RX ADMIN — APIXABAN 5 MG: 5 TABLET, FILM COATED ORAL at 09:35

## 2023-04-29 RX ADMIN — ASPIRIN 81 MG: 81 TABLET, COATED ORAL at 09:35

## 2023-04-29 RX ADMIN — ALLOPURINOL 100 MG: 100 TABLET ORAL at 09:35

## 2023-04-29 RX ADMIN — PANTOPRAZOLE SODIUM 40 MG: 40 TABLET, DELAYED RELEASE ORAL at 06:07

## 2023-04-29 NOTE — DISCHARGE SUMMARY
Discharge Summary    Elizabet Montelongo  :  1960  MRN:  [id number]    Admit date:  2023  Discharge date:      Admitting Physician:  Jennifer Orr MD    Advance Directive: Full Code    Consults: none    Primary Care Physician:  Lidya Moulton DO    Discharge Diagnoses:  Principal Problem:    A-fib [facility])  Resolved Problems:    * No resolved hospital problems. *      Cardiology Specific Data:  Specialty Problems          Cardiology Problems    Hypertension        PAF (paroxysmal atrial fibrillation) ([facility])        Sinoatrial node dysfunction (HCC)        Mild coronary artery disease        Atrial fibrillation with RVR (HCC)        AAA (abdominal aortic aneurysm) (HCC)        * (Principal) A-fib ([address]           Significant Diagnostic Studies:   No results found. Pertinent Labs:   CBC:   Recent Labs     23  1059 23  0203   WBC 8.4 7.5   HGB 16.0 15.1    272     BMP:    Recent Labs     23  1059 23  0203    142   K 4.0 4.1    105   CO2 23 22   BUN 19 20   CREATININE 1.1 1.1   GLUCOSE 101 96     INR: No results for input(s): INR in the last 72 hours. Lipids: No results for input(s): CHOL, HDL in the last 72 hours. Invalid input(s): LDLCALCU  ABGs:No results for input(s): PHART, RVD1CWE, PO2ART, EYK9SNS, BEART, HGBAE, K8QCLWOO, CARBOXHGBART, 02THERAPY in the last 72 hours. HgBA1c:  No results for input(s): LABA1C in the last 72 hours. Procedures: None      Hospital Course: Admitted 2023 initially for cardioversion but was in sinus rhythm apparently at that time later went back in atrial fibrillation and then converted again that evening to sinus rhythm where he has maintained sinus rhythm since then. Home medications continued but sotalol dosage increased to 160 mg p.o. twice daily he is tolerating that well. Rhythm sinus rhythm now has received 5 additional doses of the increased medication. EKG sinus rhythm QTc interval 474 ms.   Now stable for discharge

## 2023-05-01 DIAGNOSIS — I48.0 PAF (PAROXYSMAL ATRIAL FIBRILLATION) (HCC): Primary | ICD-10-CM

## 2023-05-01 LAB
EKG P AXIS: 31 DEGREES
EKG P-R INTERVAL: 176 MS
EKG Q-T INTERVAL: 468 MS
EKG QRS DURATION: 98 MS
EKG QTC CALCULATION (BAZETT): 469 MS
EKG T AXIS: 48 DEGREES

## 2023-05-01 PROCEDURE — 93010 ELECTROCARDIOGRAM REPORT: CPT | Performed by: INTERNAL MEDICINE

## 2023-07-17 RX ORDER — APIXABAN 5 MG/1
TABLET, FILM COATED ORAL
Qty: 60 TABLET | Refills: 5 | Status: SHIPPED | OUTPATIENT
Start: 2023-07-17

## 2023-07-31 RX ORDER — SOTALOL HYDROCHLORIDE 160 MG/1
160 TABLET ORAL 2 TIMES DAILY
Qty: 60 TABLET | Refills: 5 | Status: SHIPPED | OUTPATIENT
Start: 2023-07-31

## 2023-10-16 RX ORDER — DILTIAZEM HYDROCHLORIDE 180 MG/1
180 CAPSULE, COATED, EXTENDED RELEASE ORAL 2 TIMES DAILY
Qty: 180 CAPSULE | Refills: 3 | Status: SHIPPED | OUTPATIENT
Start: 2023-10-16

## 2024-02-26 ENCOUNTER — TELEPHONE (OUTPATIENT)
Dept: CARDIOLOGY CLINIC | Age: 64
End: 2024-02-26

## 2024-02-26 NOTE — TELEPHONE ENCOUNTER
Called to reschedule 3/1/2024 Dr. Munson appt as he will be on call. LVM. If patient calls back please schedule next available with Hermilo Doran

## 2024-02-27 ENCOUNTER — TELEPHONE (OUTPATIENT)
Dept: CARDIOLOGY CLINIC | Age: 64
End: 2024-02-27

## 2024-02-27 NOTE — TELEPHONE ENCOUNTER
Called 2x to reschedule 3/1/2024 Dr. Munson appt as he will be on call. LVM. If patient calls back please schedule next available with Hermilo Doran

## 2024-02-27 NOTE — TELEPHONE ENCOUNTER
Tried to call patient to reschedule and left a VM for them. They can reschedule with Ivett CRUZ as that is who they usually see. Dr. Munson is on call and will be out of office. 2/27/24 AH

## 2024-03-04 ENCOUNTER — OFFICE VISIT (OUTPATIENT)
Dept: CARDIOLOGY CLINIC | Age: 64
End: 2024-03-04
Payer: COMMERCIAL

## 2024-03-04 VITALS
OXYGEN SATURATION: 98 % | BODY MASS INDEX: 46.51 KG/M2 | HEIGHT: 69 IN | HEART RATE: 77 BPM | SYSTOLIC BLOOD PRESSURE: 132 MMHG | DIASTOLIC BLOOD PRESSURE: 70 MMHG | WEIGHT: 314 LBS

## 2024-03-04 DIAGNOSIS — I48.0 PAF (PAROXYSMAL ATRIAL FIBRILLATION) (HCC): Primary | ICD-10-CM

## 2024-03-04 DIAGNOSIS — I10 ESSENTIAL HYPERTENSION: ICD-10-CM

## 2024-03-04 DIAGNOSIS — I25.10 CORONARY ARTERY DISEASE INVOLVING NATIVE CORONARY ARTERY OF NATIVE HEART WITHOUT ANGINA PECTORIS: ICD-10-CM

## 2024-03-04 PROCEDURE — 3075F SYST BP GE 130 - 139MM HG: CPT | Performed by: NURSE PRACTITIONER

## 2024-03-04 PROCEDURE — 3078F DIAST BP <80 MM HG: CPT | Performed by: NURSE PRACTITIONER

## 2024-03-04 PROCEDURE — 93000 ELECTROCARDIOGRAM COMPLETE: CPT | Performed by: NURSE PRACTITIONER

## 2024-03-04 PROCEDURE — 99214 OFFICE O/P EST MOD 30 MIN: CPT | Performed by: NURSE PRACTITIONER

## 2024-03-04 RX ORDER — SPIRONOLACTONE 25 MG/1
25 TABLET ORAL DAILY
COMMUNITY
Start: 2023-12-19

## 2024-03-04 RX ORDER — GLUCOSAMINE/D3/BOSWELLIA SERRA 1500MG-400
TABLET ORAL
COMMUNITY

## 2024-03-04 RX ORDER — HYDROCHLOROTHIAZIDE 25 MG/1
25 TABLET ORAL DAILY
COMMUNITY
Start: 2024-01-29

## 2024-03-04 ASSESSMENT — ENCOUNTER SYMPTOMS
CHEST TIGHTNESS: 0
WHEEZING: 0
SHORTNESS OF BREATH: 0
COUGH: 0
SORE THROAT: 0

## 2024-03-04 NOTE — PROGRESS NOTES
[place]  Past Medical History:   Diagnosis Date    Arthritis     CPAP (continuous positive airway pressure) dependence     Diabetes mellitus (HCC)     GERD (gastroesophageal reflux disease)     Gout     Hyperlipidemia     Hypertension     Mild coronary artery disease 7/6/2020    Sinoatrial node dysfunction (HCC) 5/3/2019    5/2/2019  Converted AFL to NSR on amiodarone and dilt, placed on betapace 80 bid    Sleep apnea     SOB (shortness of breath) 7/17/2013     Past Surgical History:   Procedure Laterality Date    BACK SURGERY      CHOLECYSTECTOMY      COLONOSCOPY N/A 09/20/2022    Dr PIPPA Oates-Moderate, large amount of retained stool throughout the left colon, as much as possible was lavaged and suctioned-AP x 1, 1 yr recall    DIAGNOSTIC CARDIAC CATH LAB PROCEDURE  09/2018    DIAGNOSTIC CARDIAC CATH LAB PROCEDURE  05/2019    HEEL SPUR SURGERY  08/08/2019    JOINT REPLACEMENT Bilateral 2016    knees     Family History   Problem Relation Age of Onset    Cancer Mother     Coronary Art Dis Father     Heart Attack Father     Heart Disease Father     High Blood Pressure Father     High Cholesterol Father     No Known Problems Sister     No Known Problems Brother     No Known Problems Brother      Social History     Tobacco Use    Smoking status: Never    Smokeless tobacco: Never   Substance Use Topics    Alcohol use: No          Review of Systems:    Review of Systems   Constitutional:  Negative for activity change, chills, diaphoresis, fatigue and fever.   HENT:  Negative for congestion and sore throat.    Respiratory:  Negative for cough, chest tightness, shortness of breath and wheezing.    Cardiovascular:  Negative for chest pain, palpitations and leg swelling.   Neurological:  Negative for dizziness, syncope, light-headedness and headaches.   Psychiatric/Behavioral:  Negative for confusion. The patient is not nervous/anxious.         Objective:    /70   Pulse 77   Ht 1.753 m (5' 9\")   Wt (!) 142.4 kg

## 2024-04-15 ENCOUNTER — ANESTHESIA EVENT (OUTPATIENT)
Dept: ENDOSCOPY | Age: 64
End: 2024-04-15
Payer: COMMERCIAL

## 2024-04-16 ENCOUNTER — ANESTHESIA (OUTPATIENT)
Dept: ENDOSCOPY | Age: 64
End: 2024-04-16
Payer: COMMERCIAL

## 2024-04-16 ENCOUNTER — HOSPITAL ENCOUNTER (OUTPATIENT)
Age: 64
Setting detail: OUTPATIENT SURGERY
Discharge: HOME | End: 2024-04-16
Attending: INTERNAL MEDICINE | Admitting: INTERNAL MEDICINE
Payer: COMMERCIAL

## 2024-04-16 VITALS
HEART RATE: 58 BPM | BODY MASS INDEX: 44.28 KG/M2 | OXYGEN SATURATION: 97 % | SYSTOLIC BLOOD PRESSURE: 112 MMHG | DIASTOLIC BLOOD PRESSURE: 73 MMHG | HEIGHT: 69 IN | RESPIRATION RATE: 16 BRPM | WEIGHT: 299 LBS | TEMPERATURE: 98.7 F

## 2024-04-16 LAB
GLUCOSE BLD-MCNC: 128 MG/DL (ref 70–99)
PERFORMED ON: ABNORMAL

## 2024-04-16 PROCEDURE — 6360000002 HC RX W HCPCS: Performed by: NURSE ANESTHETIST, CERTIFIED REGISTERED

## 2024-04-16 PROCEDURE — 82962 GLUCOSE BLOOD TEST: CPT

## 2024-04-16 PROCEDURE — 2500000003 HC RX 250 WO HCPCS: Performed by: NURSE ANESTHETIST, CERTIFIED REGISTERED

## 2024-04-16 PROCEDURE — 7100000010 HC PHASE II RECOVERY - FIRST 15 MIN: Performed by: INTERNAL MEDICINE

## 2024-04-16 PROCEDURE — 2580000003 HC RX 258: Performed by: INTERNAL MEDICINE

## 2024-04-16 PROCEDURE — 45378 DIAGNOSTIC COLONOSCOPY: CPT | Performed by: INTERNAL MEDICINE

## 2024-04-16 PROCEDURE — 2580000003 HC RX 258: Performed by: NURSE ANESTHETIST, CERTIFIED REGISTERED

## 2024-04-16 PROCEDURE — 3700000001 HC ADD 15 MINUTES (ANESTHESIA): Performed by: INTERNAL MEDICINE

## 2024-04-16 PROCEDURE — 3609027000 HC COLONOSCOPY: Performed by: INTERNAL MEDICINE

## 2024-04-16 PROCEDURE — 2709999900 HC NON-CHARGEABLE SUPPLY: Performed by: INTERNAL MEDICINE

## 2024-04-16 PROCEDURE — 7100000011 HC PHASE II RECOVERY - ADDTL 15 MIN: Performed by: INTERNAL MEDICINE

## 2024-04-16 PROCEDURE — 3700000000 HC ANESTHESIA ATTENDED CARE: Performed by: INTERNAL MEDICINE

## 2024-04-16 RX ORDER — SODIUM CHLORIDE, SODIUM LACTATE, POTASSIUM CHLORIDE, CALCIUM CHLORIDE 600; 310; 30; 20 MG/100ML; MG/100ML; MG/100ML; MG/100ML
INJECTION, SOLUTION INTRAVENOUS CONTINUOUS
Status: DISCONTINUED | OUTPATIENT
Start: 2024-04-16 | End: 2024-04-16 | Stop reason: HOSPADM

## 2024-04-16 RX ORDER — LIDOCAINE HYDROCHLORIDE 10 MG/ML
INJECTION, SOLUTION INFILTRATION; PERINEURAL PRN
Status: DISCONTINUED | OUTPATIENT
Start: 2024-04-16 | End: 2024-04-16 | Stop reason: SDUPTHER

## 2024-04-16 RX ORDER — SODIUM CHLORIDE, SODIUM LACTATE, POTASSIUM CHLORIDE, CALCIUM CHLORIDE 600; 310; 30; 20 MG/100ML; MG/100ML; MG/100ML; MG/100ML
INJECTION, SOLUTION INTRAVENOUS CONTINUOUS PRN
Status: DISCONTINUED | OUTPATIENT
Start: 2024-04-16 | End: 2024-04-16 | Stop reason: SDUPTHER

## 2024-04-16 RX ORDER — PROPOFOL 10 MG/ML
INJECTION, EMULSION INTRAVENOUS PRN
Status: DISCONTINUED | OUTPATIENT
Start: 2024-04-16 | End: 2024-04-16 | Stop reason: SDUPTHER

## 2024-04-16 RX ADMIN — PROPOFOL 250 MG: 10 INJECTION, EMULSION INTRAVENOUS at 09:04

## 2024-04-16 RX ADMIN — LIDOCAINE HYDROCHLORIDE 50 MG: 10 INJECTION, SOLUTION INFILTRATION; PERINEURAL at 09:04

## 2024-04-16 RX ADMIN — SODIUM CHLORIDE, POTASSIUM CHLORIDE, SODIUM LACTATE AND CALCIUM CHLORIDE: 600; 310; 30; 20 INJECTION, SOLUTION INTRAVENOUS at 08:32

## 2024-04-16 RX ADMIN — SODIUM CHLORIDE, POTASSIUM CHLORIDE, SODIUM LACTATE AND CALCIUM CHLORIDE: 600; 310; 30; 20 INJECTION, SOLUTION INTRAVENOUS at 09:01

## 2024-04-16 ASSESSMENT — ENCOUNTER SYMPTOMS: SHORTNESS OF BREATH: 1

## 2024-04-16 ASSESSMENT — PAIN - FUNCTIONAL ASSESSMENT
PAIN_FUNCTIONAL_ASSESSMENT: NONE - DENIES PAIN
PAIN_FUNCTIONAL_ASSESSMENT: 0-10

## 2024-04-16 NOTE — OP NOTE
Patient: Dandy Tafoya : 1960  [facility] Rec#: [id number] Acc#: [id number]   Primary Care Provider Jacklyn Amado DO    Date of Procedure:  2024    Endoscopist: Drake Oates MD    Referring Provider: Jacklyn Amado DO,     Operation Performed: Colonoscopy     Indications: screening    Anesthesia:  Sedation was administered by anesthesia who monitored the patient during the procedure.    I met with Dandy Tafoya prior to procedure. We discussed the procedure itself, and I have discussed the risks of endoscopy (including-- but not limited to-- pain, discomfort, bleeding potentially requiring second endoscopic procedure and/or blood transfusion, organ perforation requiring operative repair, damage to organs near the colon, infection, aspiration, cardiopulmonary/allergic reaction), benefits, indications to endoscopy. Additionally, we discussed options other than colonoscopy. The patient expressed understanding. All questions answered. The patient decided to proceed with the procedure.  Signed informed consent was placed on the chart.    Blood Loss: minimal    Withdrawal time: n/a  Bowel Prep: adequate     Complications: no immediate complications    DESCRIPTION OF PROCEDURE:     A time out was performed. After written informed consent was obtained, the patient was placed in the left lateral position.     The perianal area was inspected, and a digital rectal exam was performed. A rectal exam was performed: normal tone, no palpable lesions. At this point, a forward viewing Olympus colonoscope was inserted into the anus and carefully advanced to the cecum.  The cecum was identified by the ileocecal valve and the appendiceal orifice. The colonoscope was then slowly withdrawn with careful inspection of the mucosa in a linear and circumferential fashion. The scope was retroflexed in the rectum. Suction was utilized during the procedure to remove as much air as possible from the bowel. The

## 2024-04-16 NOTE — DISCHARGE INSTRUCTIONS
Recommendations:  1. Repeat colonoscopy - 5 yrs for screening  2. I would suggest Miralax daily     Colonoscopy: What to Expect at Home  Your Recovery    After the test, you may be bloated or have gas pains. You may need to pass gas. If a biopsy was done or a polyp was removed, you may have streaks of blood in your stool (feces) for a few days. Problems such as heavy rectal bleeding may not occur until several weeks after the test. This isn't common. But it can happen after polyps are removed.  This care sheet gives you a general idea about how long it will take for you to recover. But each person recovers at a different pace. Follow the steps below to get better as quickly as possible.    How can you care for yourself at home?  Activity    Rest when you feel tired.     You can do your normal activities when it feels okay to do so.   Diet    You may resume your regular diet.     Drink plenty of fluids. This helps to replace the fluids that were lost during the colon prep.     Do not drink alcohol.   Medicines    Your doctor will tell you if and when you can restart your medicines. You will also be given instructions about taking any new medicines.     If you stopped taking aspirin or some other blood thinner, your doctor will tell you when to start taking it again.     If polyps were removed or a biopsy was done during the test, your doctor may tell you not to take aspirin or other anti-inflammatory medicines for a few days. These include ibuprofen (Advil, Motrin) and naproxen (Aleve).   Other instructions    For your safety, do not drive or operate machinery for 24 hours.     Do not sign legal documents or make major decisions until the medicine wears off and you can think clearly. The anesthesia can make it hard for you to fully understand what you are agreeing to, and cause impaired judgement.     When should you call for help?   Call 911 anytime you think you may need emergency care. For example, call if:

## 2024-04-16 NOTE — ANESTHESIA PRE PROCEDURE
05:33 PM       POC Tests:   Recent Labs     04/16/24  0823   POCGLU 128*       Coags: No results found for: \"PROTIME\", \"INR\", \"APTT\"    HCG (If Applicable): No results found for: \"PREGTESTUR\", \"PREGSERUM\", \"HCG\", \"HCGQUANT\"     ABGs: No results found for: \"PHART\", \"PO2ART\", \"AZD5KXT\", \"RVN2VAU\", \"BEART\", \"T7TZHKYJ\"     Type & Screen (If Applicable):  No results found for: \"LABABO\", \"LABRH\"    Drug/Infectious Status (If Applicable):  No results found for: \"HIV\", \"HEPCAB\"    COVID-19 Screening (If Applicable):   Lab Results   Component Value Date/Time    COVID19 Not Detected 03/26/2023 07:08 PM           Anesthesia Evaluation  Patient summary reviewed and Nursing notes reviewed  Airway: Mallampati: II  TM distance: >3 FB   Neck ROM: full  Mouth opening: > = 3 FB   Dental:          Pulmonary:normal exam    (+)   shortness of breath: no interval change,   sleep apnea: on CPAP,                                  Cardiovascular:  Exercise tolerance: poor (<4 METS)  (+) hypertension: no interval change, CAD:, hyperlipidemia         Beta Blocker:  Dose within 24 Hrs         Neuro/Psych:   Negative Neuro/Psych ROS              GI/Hepatic/Renal:   (+) GERD: no interval change, bowel prep, morbid obesity          Endo/Other:    (+) DiabetesType II DM, blood dyscrasia: anticoagulation therapy:..                 Abdominal:   (+) obese          Vascular:          Other Findings:         Anesthesia Plan      general and TIVA     ASA 3       Induction: intravenous.      Anesthetic plan and risks discussed with patient.                      ANTHONY Boogie - CRNA   4/16/2024

## 2024-04-16 NOTE — H&P
Patient Name: Dandy Tafoya  : 1960  MRN: [id number]  DATE: 24    Allergies:   Allergies   Allergen Reactions    Levaquin [Levofloxacin] Palpitations    Penicillins Rash        ENDOSCOPY  History and Physical    Procedure:    [] Diagnostic Colonoscopy       [x] Screening Colonoscopy  [] EGD      [] ERCP      [] EUS       [] Other    [x] Previous office notes/History and Physical reviewed from the patients chart. Please see EMR for further details of HPI. I have examined the patient's status immediately prior to the procedure and:      Indications/HPI:    []Abdominal Pain   []Barretts  [x]Screening/Surveillance   []History of Polyps  []Dysphagia            [] +Cologard/DNA testing  []Abnormal Imaging              []EOE Hx              [] Family Hx of CRC/Polyps  []Anemia                            []Food Impaction       []Recent Poor Prep  []GI Bleed             []Lymphadenopathy  []History of Polyps  []Change in bowel habits []Heartburn/Reflux  []Cancer- GI/Lung  []Chest Pain - Non Cardiac []Heme (+) Stool []Ulcers  []Constipation  []Hemoptysis  []Incontinence    []Diarrhea  []Hypoxemia  []Rectal Bleed (BRBPR)  []Nausea/Vomiting   [] Varices  []Crohns/Colitis  []Pancreatic Cyst   [] Cirrhosis   []Pancreatitis    []Abnormal MRCP  []Elevated LFT [] Stent Removal, Previous ERCP  []Other:     Anesthesia:   [x] MAC [] Moderate Sedation   [] General   [] None     ROS: 12 pt Review of Symptoms was negative unless mentioned above    Medications:   Prior to Admission medications    Medication Sig Start Date End Date Taking? Authorizing Provider   spironolactone (ALDACTONE) 25 MG tablet Take 1 tablet by mouth daily 23   Robbi Singh MD   hydroCHLOROthiazide (HYDRODIURIL) 25 MG tablet Take 1 tablet by mouth daily 24   Robbi Singh MD   Apoaequorin (PREVAGEN EXTRA STRENGTH PO)  22   Robbi Singh MD   Boswellia-Glucosamine-Vit D (OSTEO BI-FLEX ONE PER DAY) TABS

## 2024-04-16 NOTE — ANESTHESIA POSTPROCEDURE EVALUATION
Department of Anesthesiology  Postprocedure Note    Patient: Dandy Tafoya  MRN: [id number]  YOB: 1960  Date of evaluation: 4/16/2024    Procedure Summary       Date: 04/16/24 Room / Location: [place] [address] / [facility]    Anesthesia Start: 0901 Anesthesia Stop: 0918    Procedure: COLORECTAL CANCER SCREENING, NOT HIGH RISK Diagnosis:       Screen for colon cancer      Hx of colonic polyps      (Screen for colon cancer [Z12.11])      (Hx of colonic polyps [Z86.010])    Surgeons: Drake Oates MD Responsible Provider: Yair Clark APRN - CRNA    Anesthesia Type: general, TIVA ASA Status: 3            Anesthesia Type: No value filed.    Jossue Phase I: Jossue Score: 10    Jossue Phase II:      Anesthesia Post Evaluation    Patient location during evaluation: bedside  Patient participation: complete - patient participated  Level of consciousness: sleepy but conscious  Pain score: 0  Airway patency: patent  Nausea & Vomiting: no nausea and no vomiting  Cardiovascular status: hemodynamically stable and blood pressure returned to baseline  Respiratory status: acceptable, spontaneous ventilation, nonlabored ventilation and room air  Hydration status: stable  Comments: /65   Pulse 66   Temp 98.1 °F (36.7 °C) (Temporal)   Resp 16   Ht 1.753 m (5' 9\")   Wt 135.6 kg (299 lb)   SpO2 97%   BMI 44.15 kg/m²     Pain management: adequate    No notable events documented.

## 2024-09-23 ENCOUNTER — OFFICE VISIT (OUTPATIENT)
Dept: CARDIOLOGY CLINIC | Age: 64
End: 2024-09-23
Payer: COMMERCIAL

## 2024-09-23 VITALS
BODY MASS INDEX: 45.91 KG/M2 | WEIGHT: 310 LBS | HEIGHT: 69 IN | DIASTOLIC BLOOD PRESSURE: 78 MMHG | HEART RATE: 77 BPM | OXYGEN SATURATION: 95 % | SYSTOLIC BLOOD PRESSURE: 128 MMHG

## 2024-09-23 DIAGNOSIS — I10 ESSENTIAL HYPERTENSION: ICD-10-CM

## 2024-09-23 DIAGNOSIS — I25.10 CORONARY ARTERY DISEASE INVOLVING NATIVE CORONARY ARTERY OF NATIVE HEART WITHOUT ANGINA PECTORIS: ICD-10-CM

## 2024-09-23 DIAGNOSIS — I48.0 PAF (PAROXYSMAL ATRIAL FIBRILLATION) (HCC): Primary | ICD-10-CM

## 2024-09-23 PROCEDURE — 99214 OFFICE O/P EST MOD 30 MIN: CPT | Performed by: NURSE PRACTITIONER

## 2024-09-23 PROCEDURE — 3078F DIAST BP <80 MM HG: CPT | Performed by: NURSE PRACTITIONER

## 2024-09-23 PROCEDURE — 3074F SYST BP LT 130 MM HG: CPT | Performed by: NURSE PRACTITIONER

## 2024-09-23 ASSESSMENT — ENCOUNTER SYMPTOMS
CHEST TIGHTNESS: 0
SHORTNESS OF BREATH: 0
SORE THROAT: 0
WHEEZING: 0
COUGH: 0

## 2025-01-20 ENCOUNTER — TELEPHONE (OUTPATIENT)
Dept: CARDIOLOGY CLINIC | Age: 65
End: 2025-01-20

## 2025-01-20 NOTE — TELEPHONE ENCOUNTER
Date: 1/21 & 1/22     Cardiologist: Arpit     Procedure: Dental Extractions     Surgeon: Ania     Last Office Visit: 9/23/24  Reason for office visit and medical concerns addressed at this office visit: htn, afib, aaa, cad, sa node dysfunction, hyperlipidemia     Testing Performed and Date of Service:  3/26/23 Echo  Normal left ventricular cavity with overall normal systolic function. EF   60%   Diastolic dysfunction   Mod LVH   Mild LAE   Dilated ascending aorta 4cm   Mildly thickened left sided valves   Pericardium normal     Does the patient have a stent? If so, what type?  none     Current Medications: sotalol, ozempic, omeprazole, signulair, metformin, losartan, fenofibrate, eliquis, cardizem, atorvastatin, aspirin, allopurinol     Is the patient currently taking an anticoagulant? If so, what is the diagnosis the patient has been given to warrant the need for the anticoagulant? Eliquis for afib     Additional Notes: requesting to hold eliquis

## 2025-01-23 NOTE — TELEPHONE ENCOUNTER
Connor with [facility] called to see if clearance was received, advised it was and that a letter is to provider to sign and once signed it will be faxed. She voiced understanding.

## 2025-03-24 ENCOUNTER — OFFICE VISIT (OUTPATIENT)
Dept: CARDIOLOGY CLINIC | Age: 65
End: 2025-03-24
Payer: COMMERCIAL

## 2025-03-24 VITALS
SYSTOLIC BLOOD PRESSURE: 120 MMHG | WEIGHT: 315 LBS | HEART RATE: 69 BPM | DIASTOLIC BLOOD PRESSURE: 82 MMHG | HEIGHT: 69 IN | BODY MASS INDEX: 46.65 KG/M2 | OXYGEN SATURATION: 97 %

## 2025-03-24 DIAGNOSIS — I10 ESSENTIAL HYPERTENSION: ICD-10-CM

## 2025-03-24 DIAGNOSIS — I48.0 PAF (PAROXYSMAL ATRIAL FIBRILLATION) (HCC): ICD-10-CM

## 2025-03-24 DIAGNOSIS — I25.10 CORONARY ARTERY DISEASE INVOLVING NATIVE CORONARY ARTERY OF NATIVE HEART WITHOUT ANGINA PECTORIS: Primary | ICD-10-CM

## 2025-03-24 PROCEDURE — 3074F SYST BP LT 130 MM HG: CPT | Performed by: NURSE PRACTITIONER

## 2025-03-24 PROCEDURE — 3079F DIAST BP 80-89 MM HG: CPT | Performed by: NURSE PRACTITIONER

## 2025-03-24 PROCEDURE — 99214 OFFICE O/P EST MOD 30 MIN: CPT | Performed by: NURSE PRACTITIONER

## 2025-03-24 PROCEDURE — 93000 ELECTROCARDIOGRAM COMPLETE: CPT | Performed by: NURSE PRACTITIONER

## 2025-03-24 RX ORDER — ROSUVASTATIN CALCIUM 5 MG/1
5 TABLET, COATED ORAL EVERY EVENING
COMMUNITY
Start: 2025-02-10

## 2025-03-24 ASSESSMENT — ENCOUNTER SYMPTOMS
SORE THROAT: 0
WHEEZING: 0
COUGH: 0
CHEST TIGHTNESS: 0
SHORTNESS OF BREATH: 0

## 2025-03-24 NOTE — PROGRESS NOTES
betapace 80 bid    Sleep apnea     SOB (shortness of breath) 07/17/2013     Past Surgical History:   Procedure Laterality Date    ABLATION OF DYSRHYTHMIC FOCUS      BACK SURGERY      CHOLECYSTECTOMY      COLONOSCOPY N/A 09/20/2022    Dr PIPPA Oates-Moderate, large amount of retained stool throughout the left colon, as much as possible was lavaged and suctioned-AP x 1, 1 yr recall    COLONOSCOPY N/A 04/16/2024    Dr PIPPA Oates-Normal, 5 yr recall    DIAGNOSTIC CARDIAC CATH LAB PROCEDURE  09/2018    DIAGNOSTIC CARDIAC CATH LAB PROCEDURE  05/2019    HEEL SPUR SURGERY  08/08/2019    JOINT REPLACEMENT Bilateral 2016    knees     Family History   Problem Relation Age of Onset    Cancer Mother     Coronary Art Dis Father     Heart Attack Father     Heart Disease Father     High Blood Pressure Father     High Cholesterol Father     No Known Problems Sister     No Known Problems Brother     No Known Problems Brother      Social History     Tobacco Use    Smoking status: Never    Smokeless tobacco: Never   Substance Use Topics    Alcohol use: No          Review of Systems:    Review of Systems   Constitutional:  Negative for activity change, chills, diaphoresis, fatigue and fever.   HENT:  Negative for congestion and sore throat.    Respiratory:  Negative for cough, chest tightness, shortness of breath and wheezing.    Cardiovascular:  Negative for chest pain, palpitations and leg swelling.   Neurological:  Negative for dizziness, syncope, light-headedness and headaches.   Psychiatric/Behavioral:  Negative for confusion. The patient is not nervous/anxious.         Objective:    /82   Pulse 69   Ht 1.753 m (5' 9\")   Wt (!) 142.9 kg (315 lb)   SpO2 97%   BMI 46.52 kg/m²     GENERAL - well developed and well nourished, conversant  HEENT -  PERRLA, Hearing appears normal, gentleman lids are normal.  External inspection of ears and nose appear normal.  NECK - no thyromegaly, no JVD, trachea is in the midline  CARDIOVASCULAR -

## 2025-08-26 ENCOUNTER — TELEPHONE (OUTPATIENT)
Dept: NEUROSURGERY | Age: 65
End: 2025-08-26

## (undated) DEVICE — ENDO KIT,LOURDES HOSPITAL: Brand: MEDLINE INDUSTRIES, INC.

## (undated) DEVICE — SYSTEM KIT LG AD SUPERNOVA ET

## (undated) DEVICE — FORCEPS BX L240CM JAW DIA2.4MM ORNG L CAP W/ NDL DISP RAD